# Patient Record
Sex: FEMALE | Race: BLACK OR AFRICAN AMERICAN | NOT HISPANIC OR LATINO | Employment: FULL TIME | URBAN - METROPOLITAN AREA
[De-identification: names, ages, dates, MRNs, and addresses within clinical notes are randomized per-mention and may not be internally consistent; named-entity substitution may affect disease eponyms.]

---

## 2024-01-23 ENCOUNTER — VIRTUAL VISIT (OUTPATIENT)
Dept: FAMILY MEDICINE | Facility: CLINIC | Age: 29
End: 2024-01-23
Payer: COMMERCIAL

## 2024-01-23 DIAGNOSIS — F41.9 ANXIETY: ICD-10-CM

## 2024-01-23 DIAGNOSIS — F90.2 ADHD (ATTENTION DEFICIT HYPERACTIVITY DISORDER), COMBINED TYPE: Primary | ICD-10-CM

## 2024-01-23 PROCEDURE — 99204 OFFICE O/P NEW MOD 45 MIN: CPT | Mod: 95 | Performed by: FAMILY MEDICINE

## 2024-01-23 RX ORDER — DEXTROAMPHETAMINE SACCHARATE, AMPHETAMINE ASPARTATE MONOHYDRATE, DEXTROAMPHETAMINE SULFATE AND AMPHETAMINE SULFATE 2.5; 2.5; 2.5; 2.5 MG/1; MG/1; MG/1; MG/1
10 CAPSULE, EXTENDED RELEASE ORAL DAILY
Qty: 30 CAPSULE | Refills: 0 | Status: SHIPPED | OUTPATIENT
Start: 2024-01-23 | End: 2024-03-11

## 2024-01-23 RX ORDER — ESCITALOPRAM OXALATE 10 MG/1
10 TABLET ORAL DAILY
Qty: 30 TABLET | Refills: 1 | Status: SHIPPED | OUTPATIENT
Start: 2024-01-23 | End: 2024-03-07

## 2024-01-23 NOTE — PROGRESS NOTES
Marya is a 28 year old who is being evaluated via a billable video visit.      How would you like to obtain your AVS? MyChart  If the video visit is dropped, the invitation should be resent by: Text to cell phone: [phone]  Will anyone else be joining your video visit? No          Assessment & Plan     ADHD (attention deficit hyperactivity disorder), combined type  Pt was without an insurance last year 2023 , she was diagnosed with ADHD combined type in 2019   Has been on Adderall XR 15 mg in 2022 but since she has been off in 2023 , we discussed restarting it at 10 mg Adderall XR   - amphetamine-dextroamphetamine (ADDERALL XR) 10 MG 24 hr capsule; Take 1 capsule (10 mg) by mouth daily  She has also done regular exercise , walking a lot , which has helped with some of the ADHD symptoms   Now she is starting school next month and needs to be more focused     Anxiety  She has had anxiety in the past and we discussed starting lexapro, will start at 5 mg for five days then go to the 10 mg   Has been in the  and was deployed , 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 Deployment triggered and released some anxiety , sh ehas struggled with this last 2 to 3 yrs   - Adult Mental Health  Referral; Future  - escitalopram (LEXAPRO) 10 MG tablet; Take 1 tablet (10 mg) by mouth daily Start with half a tablet daily x 5 days then one tablet daily  Will follow up with me in 4 to 5 weeks   Also discussed and gave her a referral for seeing a therapist     Follow up in one month , sooner if nay concerns   Pt is aware  and comfortable with the current plan.      Subjective   Marya is a 28 year old, presenting for the following health issues:  No chief complaint on file.      1/23/2024     1:41 PM   Additional Questions   Roomed by Judah KNUTSON     History of Present Illness       Reason for visit:  Continuous of care    She eats 2-3 servings of fruits and vegetables daily.She consumes 0 sweetened beverage(s) daily.She  exercises with enough effort to increase her heart rate 60 or more minutes per day.  She exercises with enough effort to increase her heart rate 6 days per week.   She is taking medications regularly.           Review of Systems  Constitutional, neuro, ENT, endocrine, pulmonary, cardiac, gastrointestinal, genitourinary, musculoskeletal, integument and psychiatric systems are negative, except as otherwise noted.      Objective           Vitals:  No vitals were obtained today due to virtual visit.    Physical Exam   GENERAL: alert and no distress  EYES: Eyes grossly normal to inspection.  No discharge or erythema, or obvious scleral/conjunctival abnormalities.  RESP: No audible wheeze, cough, or visible cyanosis.    SKIN: Visible skin clear. No significant rash, abnormal pigmentation or lesions.  NEURO: Cranial nerves grossly intact.  Mentation and speech appropriate for age.  PSYCH: Appropriate affect, tone, and pace of words    No results found for this or any previous visit (from the past 24 hour(s)).      Video-Visit Details    Type of service:  Video Visit     Originating Location (pt. Location): Home    Distant Location (provider location):  On-site  Platform used for Video Visit: Ld  Signed Electronically by: Hannah Freedman MD

## 2024-02-10 ENCOUNTER — HEALTH MAINTENANCE LETTER (OUTPATIENT)
Age: 29
End: 2024-02-10

## 2024-03-07 ENCOUNTER — LAB (OUTPATIENT)
Dept: LAB | Facility: CLINIC | Age: 29
End: 2024-03-07
Payer: COMMERCIAL

## 2024-03-07 ENCOUNTER — VIRTUAL VISIT (OUTPATIENT)
Dept: FAMILY MEDICINE | Facility: CLINIC | Age: 29
End: 2024-03-07
Payer: COMMERCIAL

## 2024-03-07 DIAGNOSIS — Z11.59 NEED FOR HEPATITIS C SCREENING TEST: ICD-10-CM

## 2024-03-07 DIAGNOSIS — F41.9 ANXIETY: ICD-10-CM

## 2024-03-07 DIAGNOSIS — F17.200 NICOTINE DEPENDENCE, UNCOMPLICATED, UNSPECIFIED NICOTINE PRODUCT TYPE: ICD-10-CM

## 2024-03-07 DIAGNOSIS — Z11.4 SCREENING FOR HIV (HUMAN IMMUNODEFICIENCY VIRUS): Primary | ICD-10-CM

## 2024-03-07 DIAGNOSIS — F90.2 ADHD (ATTENTION DEFICIT HYPERACTIVITY DISORDER), COMBINED TYPE: Primary | ICD-10-CM

## 2024-03-07 DIAGNOSIS — F90.2 ADHD (ATTENTION DEFICIT HYPERACTIVITY DISORDER), COMBINED TYPE: ICD-10-CM

## 2024-03-07 DIAGNOSIS — Z71.6 ENCOUNTER FOR SMOKING CESSATION COUNSELING: ICD-10-CM

## 2024-03-07 LAB
ERYTHROCYTE [DISTWIDTH] IN BLOOD BY AUTOMATED COUNT: 15 % (ref 10–15)
HCT VFR BLD AUTO: 39.8 % (ref 35–47)
HGB BLD-MCNC: 13.3 G/DL (ref 11.7–15.7)
MCH RBC QN AUTO: 29.8 PG (ref 26.5–33)
MCHC RBC AUTO-ENTMCNC: 33.4 G/DL (ref 31.5–36.5)
MCV RBC AUTO: 89 FL (ref 78–100)
PLATELET # BLD AUTO: 223 10E3/UL (ref 150–450)
RBC # BLD AUTO: 4.46 10E6/UL (ref 3.8–5.2)
WBC # BLD AUTO: 5.3 10E3/UL (ref 4–11)

## 2024-03-07 PROCEDURE — 36415 COLL VENOUS BLD VENIPUNCTURE: CPT

## 2024-03-07 PROCEDURE — 80053 COMPREHEN METABOLIC PANEL: CPT

## 2024-03-07 PROCEDURE — 86803 HEPATITIS C AB TEST: CPT

## 2024-03-07 PROCEDURE — G0481 DRUG TEST DEF 8-14 CLASSES: HCPCS

## 2024-03-07 PROCEDURE — 99214 OFFICE O/P EST MOD 30 MIN: CPT | Mod: 95 | Performed by: FAMILY MEDICINE

## 2024-03-07 PROCEDURE — 82607 VITAMIN B-12: CPT

## 2024-03-07 PROCEDURE — 82306 VITAMIN D 25 HYDROXY: CPT

## 2024-03-07 PROCEDURE — 87389 HIV-1 AG W/HIV-1&-2 AB AG IA: CPT

## 2024-03-07 PROCEDURE — 82728 ASSAY OF FERRITIN: CPT

## 2024-03-07 PROCEDURE — 85027 COMPLETE CBC AUTOMATED: CPT

## 2024-03-07 PROCEDURE — 84443 ASSAY THYROID STIM HORMONE: CPT

## 2024-03-07 RX ORDER — NICOTINE 21 MG/24HR
1 PATCH, TRANSDERMAL 24 HOURS TRANSDERMAL EVERY 24 HOURS
Qty: 42 PATCH | Refills: 0 | Status: SHIPPED | OUTPATIENT
Start: 2024-03-07 | End: 2024-04-18

## 2024-03-07 RX ORDER — ESCITALOPRAM OXALATE 10 MG/1
10 TABLET ORAL DAILY
Qty: 90 TABLET | Refills: 1 | Status: SHIPPED | OUTPATIENT
Start: 2024-03-07 | End: 2024-05-17

## 2024-03-07 RX ORDER — NICOTINE 21 MG/24HR
1 PATCH, TRANSDERMAL 24 HOURS TRANSDERMAL EVERY 24 HOURS
Qty: 14 PATCH | Refills: 0 | Status: SHIPPED | OUTPATIENT
Start: 2024-04-18 | End: 2024-05-02

## 2024-03-07 ASSESSMENT — ANXIETY QUESTIONNAIRES
6. BECOMING EASILY ANNOYED OR IRRITABLE: NOT AT ALL
8. IF YOU CHECKED OFF ANY PROBLEMS, HOW DIFFICULT HAVE THESE MADE IT FOR YOU TO DO YOUR WORK, TAKE CARE OF THINGS AT HOME, OR GET ALONG WITH OTHER PEOPLE?: NOT DIFFICULT AT ALL
GAD7 TOTAL SCORE: 2
4. TROUBLE RELAXING: NOT AT ALL
1. FEELING NERVOUS, ANXIOUS, OR ON EDGE: NOT AT ALL
7. FEELING AFRAID AS IF SOMETHING AWFUL MIGHT HAPPEN: NOT AT ALL
5. BEING SO RESTLESS THAT IT IS HARD TO SIT STILL: SEVERAL DAYS
7. FEELING AFRAID AS IF SOMETHING AWFUL MIGHT HAPPEN: NOT AT ALL
2. NOT BEING ABLE TO STOP OR CONTROL WORRYING: NOT AT ALL
IF YOU CHECKED OFF ANY PROBLEMS ON THIS QUESTIONNAIRE, HOW DIFFICULT HAVE THESE PROBLEMS MADE IT FOR YOU TO DO YOUR WORK, TAKE CARE OF THINGS AT HOME, OR GET ALONG WITH OTHER PEOPLE: NOT DIFFICULT AT ALL
3. WORRYING TOO MUCH ABOUT DIFFERENT THINGS: SEVERAL DAYS

## 2024-03-07 ASSESSMENT — PATIENT HEALTH QUESTIONNAIRE - PHQ9
10. IF YOU CHECKED OFF ANY PROBLEMS, HOW DIFFICULT HAVE THESE PROBLEMS MADE IT FOR YOU TO DO YOUR WORK, TAKE CARE OF THINGS AT HOME, OR GET ALONG WITH OTHER PEOPLE: NOT DIFFICULT AT ALL
SUM OF ALL RESPONSES TO PHQ QUESTIONS 1-9: 6
SUM OF ALL RESPONSES TO PHQ QUESTIONS 1-9: 6

## 2024-03-07 NOTE — PROGRESS NOTES
Isidro is a 28 year old who is being evaluated via a billable video visit.      How would you like to obtain your AVS? MyChart  If the video visit is dropped, the invitation should be resent by: Text to cell phone: [phone]  Will anyone else be joining your video visit? No    Assessment & Plan     ADHD (attention deficit hyperactivity disorder), combined type  Patient has regular follow-up with her primary care provider Dr. Jaquez.  Records from previous primary care provider with [facility] were reviewed.  Diagnosis confirmed.  - Drug Confirmation Panel Urine with Creat - lab collect    Nicotine dependence, uncomplicated, unspecified nicotine product type  Encounter for smoking cessation counseling  - SMOKING CESSATION COUNSELING, 3-10 MIN  - TOBACCO CESSATION - FOR HEALTH MAINTENANCE  - nicotine (NICODERM CQ) 21 MG/24HR 24 hr patch  Dispense: 42 patch; Refill: 0  - nicotine (NICODERM CQ) 14 MG/24HR 24 hr patch  Dispense: 14 patch; Refill: 0  - nicotine (NICODERM CQ) 7 MG/24HR 24 hr patch  Dispense: 14 patch; Refill: 0        Anxiety  - escitalopram (LEXAPRO) 10 MG tablet  Dispense: 90 tablet; Refill: 1  - Ferritin  - Vitamin D Deficiency  - Vitamin B12  - CBC with platelets  - Comprehensive metabolic panel (BMP + Alb, Alk Phos, ALT, AST, Total. Bili, TP)  - TSH with free T4 reflex      Subjective   Isidro is a 28 year old, presenting for the following health issues:  Refill Request (Lexapro and Adderral/)        3/7/2024    10:50 AM   Additional Questions   Roomed by Mallory ORTIZ     History of Present Illness       Reason for visit:  Continuance of care    She eats 2-3 servings of fruits and vegetables daily.She consumes 0 sweetened beverage(s) daily.She exercises with enough effort to increase her heart rate 30 to 60 minutes per day.  She exercises with enough effort to increase her heart rate 5 days per week. She is missing 1 dose(s) of medications per week.  She is not taking prescribed medications regularly due  to remembering to take.       ADHD Follow-Up    Date of last ADHD office visit: 1/23/24  Status since last visit: Improving - has been managing anxiety better w/ lexapro  Taking controlled (daily) medications as prescribed: Yes                       Parent/Patient Concerns with Medications: None    ADHD Medication       Amphetamines Disp Start End     amphetamine-dextroamphetamine (ADDERALL XR) 10 MG 24 hr capsule 30 capsule 1/23/2024 --    Sig - Route: Take 1 capsule (10 mg) by mouth daily - Oral    Class: E-Prescribe    Earliest Fill Date: 1/23/2024            Medication side effects:  Side effects noted: none  Started smoking at the age of 22 smoked hooka. Started vaping in 2020. Currently smokes disposable  vape pens 5%. One pen every 1-1.5 weeks.       Review of Systems  Constitutional, neuro, ENT, endocrine, pulmonary, cardiac, gastrointestinal, genitourinary, musculoskeletal, integument and psychiatric systems are negative, except as otherwise noted.      Objective           Vitals:  No vitals were obtained today due to virtual visit.    Physical Exam   GENERAL: alert and no distress  EYES: Eyes grossly normal to inspection.  No discharge or erythema, or obvious scleral/conjunctival abnormalities.  RESP: No audible wheeze, cough, or visible cyanosis.    SKIN: Visible skin clear. No significant rash, abnormal pigmentation or lesions.  NEURO: Cranial nerves grossly intact.  Mentation and speech appropriate for age.  PSYCH: Appropriate affect, tone, and pace of words    Historic Results on 03/07/2008   Component Date Value Ref Range Status    27 OH Vit D2 03/07/2008 13  ug/L Final    27 OH Vit D3 03/07/2008 23  ug/L Final    27 OH Vit D total 03/07/2008 36  ug/L Final    Comment: Season, race, dietary intake, and treatment affect the concentration of                            25-hydroxy-Vitamin D. Values may decrease during winter months and increase                            during summer months. Values less  than 30 ug/L may indicate Vitamin D                            deficiency.         Video-Visit Details    Type of service:  Video Visit     Originating Location (pt. Location): Home    Distant Location (provider location):  On-site  Platform used for Video Visit: Ld  Signed Electronically by: Margie Jean MD

## 2024-03-07 NOTE — LETTER
[facility] [place]  03/07/24  Patient: Marya Mtz  YOB: 1995  Medical Record Number: [id number]                                                                                  Non-Opioid Controlled Substance Agreement    This is an agreement between you and your provider regarding safe and appropriate use of controlled substances prescribed by your care team. Controlled substances are?medicines that can cause physical and mental dependence (abuse).     There are strict laws about having and using these medicines. We here at [facility] are  committed to working with you in your efforts to get better. To support you in this work, we'll help you schedule regular office appointments for medicine refills. If we must cancel or change your appointment for any reason, we'll make sure you have enough medicine to last until your next appointment.     As a Provider, I will:   Listen carefully to your concerns while treating you with respect.   Recommend a treatment plan that I believe is in your best interest and may involve therapies other than medicine.    Talk with you often about the possible benefits and the risk of harm of any medicine that we prescribe for you.  Assess the safety of this medicine and check how well it works.    Provide a plan on how to taper (discontinue or go off) using this medicine if the decision is made to stop its use.      ::  As a Patient, I understand controlled substances:     Are prescribed by my care provider to help me function or work and manage my condition(s).?  Are strong medicines and can cause serious side effects.     Need to be taken exactly as prescribed.?Combining controlled substances with certain medicines or chemicals (such as illegal drugs, alcohol, sedatives, sleeping pills, and benzodiazepines) can be dangerous or even fatal.? If I stop taking my medicines suddenly, I may have severe withdrawal symptoms.     The risks, benefits, and side  effects of these medicine(s) were explained to me. I agree that:    I will take part in other treatments as advised by my care team. This may be psychiatry or counseling, physical therapy, behavioral therapy, group treatment or a referral to specialist.    I will keep all my appointments and understand this is part of the monitoring of controlled substances.?My care team may require an office visit for EVERY controlled substance refill. If I miss appointments or don t follow instructions, my care team may stop my medicine    I will take my medicines as prescribed. I will not change the dose or schedule unless my care team tells me to. There will be no refills if I run out early.      I may be asked to come to the clinic and complete a urine drug test or complete a pill count. If I don t give a urine sample or participate in a pill count, the care team may stop my medicine.    I will only receive controlled substance prescriptions from this clinic. If I am treated by another provider, I will tell them that I am taking controlled substances and that I have a treatment agreement with this provider. I will inform my [facility] care team within one business day if I am given a prescription for any controlled substance by another healthcare provider. My [facility] care team can contact other providers and pharmacists about my use of any medicines.    It is up to me to make sure that I don't run out of my medicines on weekends or holidays.?If my care team is willing to refill my prescription without a visit, I must request refills only during office hours. Refills may take up to 3 business days to process. I will use one pharmacy to fill all my controlled substance prescriptions. I will notify the clinic about any changes to my insurance or medicine availability.    I am responsible for my prescriptions. If the medicine/prescription is lost, stolen or destroyed, it will not be replaced.?I also agree not to  share controlled substance medicines with anyone.     I am aware I should not use any illegal or recreational drugs. I agree not to drink alcohol unless my care team says I can.     If I enroll in the [place] [facility] Cannabis program, I will tell my care team before my next refill.    I will tell my care team right away if I become pregnant, have a new medical problem treated outside of my regular clinic, or have a change in my medicines.     I understand that this medicine can affect my thinking, judgment and reaction time.? Alcohol and drugs affect the brain and body, which can affect the safety of my driving. Being under the influence of alcohol or drugs can affect my decision-making, behaviors, personal safety and the safety of others. Driving while impaired (DWI) can occur if a person is driving, operating or in physical control of a car, motorcycle, boat, snowmobile, ATV, motorbike, off-road vehicle or any other motor vehicle ([place] Statute 169A.20). I understand the risk if I choose to drive or operate any vehicle or machinery.    I understand that if I do not follow any of the conditions above, my prescriptions or treatment may be stopped or changed.   I agree that my provider, clinic care team and pharmacy may work with any city, state or federal law enforcement agency that investigates the misuse, sale or other diversion of my controlled medicine. I will allow my provider to discuss my care with, or share a copy of, this agreement with any other treating provider, pharmacy or emergency room where I receive care.     I have read this agreement and have asked questions about anything I did not understand.    ________________________________________________________  Patient Signature - Marya Mousa     ___________________                   Date     ________________________________________________________  Provider Signature Bryon Jean MD       ___________________                   Date      ________________________________________________________  Witness Signature (required if provider not present while patient signing)          ___________________                   Date

## 2024-03-07 NOTE — PATIENT INSTRUCTIONS
Nicotine Transdermal System   Habitrol, Nicoderm C-Q    Uses  For quitting smoking.    Instructions  DO NOT take this medicine by mouth.    Avoid placing the patch near the breast.    Remove the patch after 24 hours.    Keep the medicine at room temperature. Avoid heat and direct light.    This patch should not be cut.    Wash your hands before and after handling this medicine.    Remove old patch before applying new one. Change the location of the new patch.    If you have vivid dreams or trouble sleeping, you may remove the patch before going to sleep.    Ask your doctor or pharmacist about locations on your body where this patch can be used.    Remove the plastic liner that protects the sticky side of the patch before applying to the skin.    Be sure the area of skin is clean and dry before putting on a new patch.    Apply the patch to a clean, dry, hairless area.    Press the patch firmly for a few seconds to make sure it stays in place.    After removing the patch, fold it together and discard it out of reach of children and pets.    Please ask your doctor or pharmacist how you can safely dispose of used patches.    If the skin under the patch becomes irritated, remove the patch. Do not apply a new patch to the area until the skin feels better.    To avoid irritating your skin, use a different location for a new patch.    Apply the patch only to normal looking skin. Avoid areas of the skin that are red, have scrapes, or damaged.    If the patch falls off, apply a new a patch on a different location of the body.    Please tell your doctor and pharmacist about all the medicines you take. Include both prescription and over-the-counter medicines. Also tell them about any vitamins, herbal medicines, or anything else you take for your health.    If you need to stop this medicine, your doctor may wish to gradually reduce the dosage before stopping.    Do not use more than 1 patch at any one time.    Cautions  Tell  your doctor and pharmacist if you ever had an allergic reaction to a medicine. Symptoms of an allergic reaction can include trouble breathing, skin rash, itching, swelling, or severe dizziness.    Do not use the medication any more than instructed.    Avoid smoking while on this medicine. Smoking may increase your risk for stroke, heart attack, blood clots, high blood pressure, and other diseases of the heart and blood vessels.    Tell the doctor or pharmacist if you are pregnant, planning to be pregnant, or breastfeeding.    Ask your pharmacist if this medicine can interact with any of your other medicines. Be sure to tell them about all the medicines you take.    Please tell all your doctors and dentists that you are on this medicine before they provide care.    Side Effects  The following is a list of some common side effects from this medicine. Please speak with your doctor about what you should do if you experience these or other side effects.    skin irritation where medicine is applied    If you have any of the following side effects, you may be getting too much medicine. Please contact your doctor to let them know about these side effects.    diarrhea  dizziness  nausea  rapid heartbeat  vomiting    A few people may have an allergic reaction to this medicine. Symptoms can include difficulty breathing, skin rash, itching, swelling, or severe dizziness. If you notice any of these symptoms, seek medical help quickly.    Extra  Please speak with your doctor, nurse, or pharmacist if you have any questions about this medicine.      [email or web address]  IMPORTANT NOTE: This document tells you briefly how to take your medicine, but it does not tell you all there is to know about it. Your doctor or pharmacist may give you other documents about your medicine. Please talk to them if you have any questions. Always follow their advice. There is a more complete description of this medicine  available in English. Scan this code on your smartphone or tablet or use the web address below. You can also ask your pharmacist for a printout. If you have any questions, please ask your pharmacist.   2021 [email or web address].      3478-2981 [employer]. All rights reserved. This information is not intended as a substitute for professional medical care. Always follow your healthcare professional's instructions.

## 2024-03-08 LAB
ALBUMIN SERPL BCG-MCNC: 4.3 G/DL (ref 3.5–5.2)
ALP SERPL-CCNC: 74 U/L (ref 40–150)
ALT SERPL W P-5'-P-CCNC: 12 U/L (ref 0–50)
ANION GAP SERPL CALCULATED.3IONS-SCNC: 10 MMOL/L (ref 7–15)
AST SERPL W P-5'-P-CCNC: 22 U/L (ref 0–45)
BILIRUB SERPL-MCNC: 0.2 MG/DL
BUN SERPL-MCNC: 6 MG/DL (ref 6–20)
CALCIUM SERPL-MCNC: 9.1 MG/DL (ref 8.6–10)
CHLORIDE SERPL-SCNC: 106 MMOL/L (ref 98–107)
CREAT SERPL-MCNC: 0.63 MG/DL (ref 0.51–0.95)
CREAT UR-MCNC: 102 MG/DL
DEPRECATED HCO3 PLAS-SCNC: 25 MMOL/L (ref 22–29)
EGFRCR SERPLBLD CKD-EPI 2021: >90 ML/MIN/1.73M2
FERRITIN SERPL-MCNC: 44 NG/ML (ref 6–175)
GLUCOSE SERPL-MCNC: 105 MG/DL (ref 70–99)
HCV AB SERPL QL IA: NONREACTIVE
HIV 1+2 AB+HIV1 P24 AG SERPL QL IA: NONREACTIVE
POTASSIUM SERPL-SCNC: 4 MMOL/L (ref 3.4–5.3)
PROT SERPL-MCNC: 7.2 G/DL (ref 6.4–8.3)
SODIUM SERPL-SCNC: 141 MMOL/L (ref 135–145)
TSH SERPL DL<=0.005 MIU/L-ACNC: 1.25 UIU/ML (ref 0.3–4.2)
VIT B12 SERPL-MCNC: 518 PG/ML (ref 232–1245)
VIT D+METAB SERPL-MCNC: 24 NG/ML (ref 20–50)

## 2024-03-11 ENCOUNTER — MYC REFILL (OUTPATIENT)
Dept: FAMILY MEDICINE | Facility: CLINIC | Age: 29
End: 2024-03-11
Payer: COMMERCIAL

## 2024-03-11 DIAGNOSIS — F90.2 ADHD (ATTENTION DEFICIT HYPERACTIVITY DISORDER), COMBINED TYPE: ICD-10-CM

## 2024-03-12 LAB
AMPHET UR CFM-MCNC: 435 NG/ML
AMPHET/CREAT UR: 426 NG/MG {CREAT}

## 2024-03-12 RX ORDER — DEXTROAMPHETAMINE SACCHARATE, AMPHETAMINE ASPARTATE MONOHYDRATE, DEXTROAMPHETAMINE SULFATE AND AMPHETAMINE SULFATE 2.5; 2.5; 2.5; 2.5 MG/1; MG/1; MG/1; MG/1
10 CAPSULE, EXTENDED RELEASE ORAL DAILY
Qty: 30 CAPSULE | Refills: 0 | Status: SHIPPED | OUTPATIENT
Start: 2024-03-12

## 2024-03-19 ENCOUNTER — VIRTUAL VISIT (OUTPATIENT)
Dept: PSYCHOLOGY | Facility: CLINIC | Age: 29
End: 2024-03-19
Payer: COMMERCIAL

## 2024-03-19 DIAGNOSIS — F32.1 MODERATE MAJOR DEPRESSION (H): ICD-10-CM

## 2024-03-19 DIAGNOSIS — F41.1 GAD (GENERALIZED ANXIETY DISORDER): ICD-10-CM

## 2024-03-19 DIAGNOSIS — F90.2 ADHD (ATTENTION DEFICIT HYPERACTIVITY DISORDER), COMBINED TYPE: Primary | ICD-10-CM

## 2024-03-19 PROCEDURE — 90791 PSYCH DIAGNOSTIC EVALUATION: CPT | Mod: 95 | Performed by: SOCIAL WORKER

## 2024-03-19 ASSESSMENT — PATIENT HEALTH QUESTIONNAIRE - PHQ9
10. IF YOU CHECKED OFF ANY PROBLEMS, HOW DIFFICULT HAVE THESE PROBLEMS MADE IT FOR YOU TO DO YOUR WORK, TAKE CARE OF THINGS AT HOME, OR GET ALONG WITH OTHER PEOPLE: NOT DIFFICULT AT ALL
SUM OF ALL RESPONSES TO PHQ QUESTIONS 1-9: 3
SUM OF ALL RESPONSES TO PHQ QUESTIONS 1-9: 3

## 2024-03-24 ASSESSMENT — COLUMBIA-SUICIDE SEVERITY RATING SCALE - C-SSRS
3. HAVE YOU BEEN THINKING ABOUT HOW YOU MIGHT KILL YOURSELF?: NO
TOTAL  NUMBER OF INTERRUPTED ATTEMPTS LIFETIME: NO
5. HAVE YOU STARTED TO WORK OUT OR WORKED OUT THE DETAILS OF HOW TO KILL YOURSELF? DO YOU INTEND TO CARRY OUT THIS PLAN?: NO
6. HAVE YOU EVER DONE ANYTHING, STARTED TO DO ANYTHING, OR PREPARED TO DO ANYTHING TO END YOUR LIFE?: NO
2. HAVE YOU ACTUALLY HAD ANY THOUGHTS OF KILLING YOURSELF?: YES
1. IN THE PAST MONTH, HAVE YOU WISHED YOU WERE DEAD OR WISHED YOU COULD GO TO SLEEP AND NOT WAKE UP?: NO
REASONS FOR IDEATION LIFETIME: DOES NOT APPLY
2. HAVE YOU ACTUALLY HAD ANY THOUGHTS OF KILLING YOURSELF?: NO
REASONS FOR IDEATION PAST MONTH: DOES NOT APPLY
1. HAVE YOU WISHED YOU WERE DEAD OR WISHED YOU COULD GO TO SLEEP AND NOT WAKE UP?: YES
TOTAL  NUMBER OF ABORTED OR SELF INTERRUPTED ATTEMPTS LIFETIME: NO
ATTEMPT LIFETIME: NO
4. HAVE YOU HAD THESE THOUGHTS AND HAD SOME INTENTION OF ACTING ON THEM?: NO

## 2024-03-24 NOTE — PATIENT INSTRUCTIONS
Client interested in pursuing therapy for anxiety, focus and Trauma.  Would like to be seen sooner than therapist availability.   Therapist will reach out to team.
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
## 2024-05-01 ENCOUNTER — VIRTUAL VISIT (OUTPATIENT)
Dept: PSYCHOLOGY | Facility: CLINIC | Age: 29
End: 2024-05-01
Payer: COMMERCIAL

## 2024-05-01 DIAGNOSIS — F32.1 MODERATE MAJOR DEPRESSION (H): ICD-10-CM

## 2024-05-01 DIAGNOSIS — F90.2 ADHD (ATTENTION DEFICIT HYPERACTIVITY DISORDER), COMBINED TYPE: Primary | ICD-10-CM

## 2024-05-01 DIAGNOSIS — F41.1 GAD (GENERALIZED ANXIETY DISORDER): ICD-10-CM

## 2024-05-01 DIAGNOSIS — F43.10 POST TRAUMATIC STRESS DISORDER: ICD-10-CM

## 2024-05-01 PROCEDURE — 90837 PSYTX W PT 60 MINUTES: CPT | Mod: 95
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
## 2024-05-09 ENCOUNTER — VIRTUAL VISIT (OUTPATIENT)
Dept: PSYCHOLOGY | Facility: CLINIC | Age: 29
End: 2024-05-09
Payer: COMMERCIAL

## 2024-05-09 DIAGNOSIS — F32.1 MODERATE MAJOR DEPRESSION (H): ICD-10-CM

## 2024-05-09 DIAGNOSIS — F90.2 ADHD (ATTENTION DEFICIT HYPERACTIVITY DISORDER), COMBINED TYPE: Primary | ICD-10-CM

## 2024-05-09 DIAGNOSIS — F41.1 GAD (GENERALIZED ANXIETY DISORDER): ICD-10-CM

## 2024-05-09 PROCEDURE — 90834 PSYTX W PT 45 MINUTES: CPT | Mod: 95

## 2024-05-09 ASSESSMENT — ANXIETY QUESTIONNAIRES
7. FEELING AFRAID AS IF SOMETHING AWFUL MIGHT HAPPEN: NOT AT ALL
2. NOT BEING ABLE TO STOP OR CONTROL WORRYING: SEVERAL DAYS
IF YOU CHECKED OFF ANY PROBLEMS ON THIS QUESTIONNAIRE, HOW DIFFICULT HAVE THESE PROBLEMS MADE IT FOR YOU TO DO YOUR WORK, TAKE CARE OF THINGS AT HOME, OR GET ALONG WITH OTHER PEOPLE: SOMEWHAT DIFFICULT
6. BECOMING EASILY ANNOYED OR IRRITABLE: NOT AT ALL
3. WORRYING TOO MUCH ABOUT DIFFERENT THINGS: SEVERAL DAYS
GAD7 TOTAL SCORE: 5
GAD7 TOTAL SCORE: 5
5. BEING SO RESTLESS THAT IT IS HARD TO SIT STILL: SEVERAL DAYS
1. FEELING NERVOUS, ANXIOUS, OR ON EDGE: SEVERAL DAYS

## 2024-05-09 ASSESSMENT — PATIENT HEALTH QUESTIONNAIRE - PHQ9: 5. POOR APPETITE OR OVEREATING: SEVERAL DAYS
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
## 2024-05-16 ENCOUNTER — VIRTUAL VISIT (OUTPATIENT)
Dept: PSYCHOLOGY | Facility: CLINIC | Age: 29
End: 2024-05-16
Payer: COMMERCIAL

## 2024-05-16 DIAGNOSIS — F32.1 MODERATE MAJOR DEPRESSION (H): ICD-10-CM

## 2024-05-16 DIAGNOSIS — F41.1 GAD (GENERALIZED ANXIETY DISORDER): ICD-10-CM

## 2024-05-16 DIAGNOSIS — F90.2 ADHD (ATTENTION DEFICIT HYPERACTIVITY DISORDER), COMBINED TYPE: Primary | ICD-10-CM

## 2024-05-16 PROCEDURE — 90834 PSYTX W PT 45 MINUTES: CPT | Mod: 95
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
## 2024-05-17 ENCOUNTER — VIRTUAL VISIT (OUTPATIENT)
Dept: FAMILY MEDICINE | Facility: CLINIC | Age: 29
End: 2024-05-17
Payer: COMMERCIAL

## 2024-05-17 DIAGNOSIS — F90.2 ADHD (ATTENTION DEFICIT HYPERACTIVITY DISORDER), COMBINED TYPE: Primary | ICD-10-CM

## 2024-05-17 DIAGNOSIS — F41.9 ANXIETY: ICD-10-CM

## 2024-05-17 PROCEDURE — 99214 OFFICE O/P EST MOD 30 MIN: CPT | Mod: 95 | Performed by: FAMILY MEDICINE

## 2024-05-17 RX ORDER — ESCITALOPRAM OXALATE 10 MG/1
10 TABLET ORAL DAILY
Qty: 90 TABLET | Refills: 1 | Status: SHIPPED | OUTPATIENT
Start: 2024-05-17

## 2024-05-17 RX ORDER — DEXTROAMPHETAMINE SACCHARATE, AMPHETAMINE ASPARTATE MONOHYDRATE, DEXTROAMPHETAMINE SULFATE AND AMPHETAMINE SULFATE 3.75; 3.75; 3.75; 3.75 MG/1; MG/1; MG/1; MG/1
15 CAPSULE, EXTENDED RELEASE ORAL DAILY
Qty: 30 CAPSULE | Refills: 0 | Status: SHIPPED | OUTPATIENT
Start: 2024-06-16 | End: 2024-07-16

## 2024-05-17 RX ORDER — DEXTROAMPHETAMINE SACCHARATE, AMPHETAMINE ASPARTATE MONOHYDRATE, DEXTROAMPHETAMINE SULFATE AND AMPHETAMINE SULFATE 3.75; 3.75; 3.75; 3.75 MG/1; MG/1; MG/1; MG/1
15 CAPSULE, EXTENDED RELEASE ORAL DAILY
Qty: 30 CAPSULE | Refills: 0 | Status: SHIPPED | OUTPATIENT
Start: 2024-05-17 | End: 2024-06-16

## 2024-05-17 RX ORDER — DEXTROAMPHETAMINE SACCHARATE, AMPHETAMINE ASPARTATE MONOHYDRATE, DEXTROAMPHETAMINE SULFATE AND AMPHETAMINE SULFATE 3.75; 3.75; 3.75; 3.75 MG/1; MG/1; MG/1; MG/1
15 CAPSULE, EXTENDED RELEASE ORAL DAILY
Qty: 30 CAPSULE | Refills: 0 | Status: SHIPPED | OUTPATIENT
Start: 2024-07-16 | End: 2024-08-15

## 2024-05-17 NOTE — LETTER
May 17, 2024      Marya Mtz  [address] [address]        To Whom It May Concern:    Marya Mtz was seen in our clinic. She is under my professional care. She needs to have accommodations for school, for testing, homework etc because of her diagnosis of ADHD .         Sincerely,    Hannah Freedman M.D.

## 2024-05-17 NOTE — PROGRESS NOTES
Isidro is a 28 year old who is being evaluated via a billable video visit.    How would you like to obtain your AVS? MyChart  If the video visit is dropped, the invitation should be resent by: Text to cell phone: [phone]  Will anyone else be joining your video visit? No      Assessment & Plan     Anxiety  Has been seeing  a therapist , which is helping , also feels better on Lexapro , no side effects - refilled   - escitalopram (LEXAPRO) 10 MG tablet; Take 1 tablet (10 mg) by mouth daily    ADHD (attention deficit hyperactivity disorder), combined type  Focus and attention have improved but we started on a lower dose than what she used to be . She has no side effects , seems that the 10 mg wears out so we discussed increase to 15 mg  of the Adderall XR   - amphetamine-dextroamphetamine (ADDERALL XR) 15 MG 24 hr capsule; Take 1 capsule (15 mg) by mouth daily for 30 days  - amphetamine-dextroamphetamine (ADDERALL XR) 15 MG 24 hr capsule; Take 1 capsule (15 mg) by mouth daily for 30 days  - amphetamine-dextroamphetamine (ADDERALL XR) 15 MG 24 hr capsule; Take 1 capsule (15 mg) by mouth daily for 30 days      Follow up in three months sooner if any concern   Pt is aware  and comfortable with the current plan.      Subjective   Isidro is a 28 year old, presenting for the following health issues:  No chief complaint on file.        5/17/2024    12:58 PM   Additional Questions   Roomed by ANGEL Moran         5/17/2024   Forms   Any forms needing to be completed Yes     History of Present Illness       Reason for visit:  Medication refill    She eats 2-3 servings of fruits and vegetables daily.She consumes 1 sweetened beverage(s) daily.She exercises with enough effort to increase her heart rate 60 or more minutes per day.  She exercises with enough effort to increase her heart rate 6 days per week. She is missing 2 dose(s) of medications per week.  She is not taking prescribed medications regularly due to remembering to  "take.    ADHD Follow-Up    Date of last ADHD office visit: 01/2024  Status since last visit: Improving  Taking controlled (daily) medications as prescribed: Yes                       Parent/Patient Concerns with Medications: Pt requesting medication refill.   ADHD Medication       Amphetamines Disp Start End     amphetamine-dextroamphetamine (ADDERALL XR) 10 MG 24 hr capsule 30 capsule 3/12/2024 --    Sig - Route: Take 1 capsule (10 mg) by mouth daily - Oral    Class: E-Prescribe    Earliest Fill Date: 3/12/2024              Sleep: no problems  Home/Family Concerns: None  Peer Concerns: None    Co-Morbid Diagnosis: Anxiety    Currently in counseling: Yes        Medication Benefits:   Controlled symptoms: Attention span, Distractability, Finishing tasks, and Impulse control  Uncontrolled Symptoms : None    Medication side effects:  Side effects noted: none  Denies: appetite suppression, weight loss, insomnia, tics, palpitations, stomach ache, headache, emotional lability, rebound irritability, drowsiness, \"zombie\" effect, growth suppression, and dry mouth           Review of Systems  Constitutional, HEENT, cardiovascular, pulmonary, GI, , musculoskeletal, neuro, skin, endocrine and psych systems are negative, except as otherwise noted.      Objective           Vitals:  No vitals were obtained today due to virtual visit.    Physical Exam   GENERAL: alert and no distress  EYES: Eyes grossly normal to inspection.  No discharge or erythema, or obvious scleral/conjunctival abnormalities.  RESP: No audible wheeze, cough, or visible cyanosis.    SKIN: Visible skin clear. No significant rash, abnormal pigmentation or lesions.  NEURO: Cranial nerves grossly intact.  Mentation and speech appropriate for age.  PSYCH: Appropriate affect, tone, and pace of words    No results found for any visits on 05/17/24.      Video-Visit Details    Type of service:  Video Visit   Originating Location (pt. Location): Home    Distant " Location (provider location):  On-site  Platform used for Video Visit: Ld  Signed Electronically by: Hannah Freedman MD

## 2024-05-22 ENCOUNTER — VIRTUAL VISIT (OUTPATIENT)
Dept: PSYCHOLOGY | Facility: CLINIC | Age: 29
End: 2024-05-22
Payer: COMMERCIAL

## 2024-05-22 DIAGNOSIS — F90.2 ADHD (ATTENTION DEFICIT HYPERACTIVITY DISORDER), COMBINED TYPE: Primary | ICD-10-CM

## 2024-05-22 DIAGNOSIS — F32.1 MODERATE MAJOR DEPRESSION (H): ICD-10-CM

## 2024-05-22 DIAGNOSIS — F41.1 GAD (GENERALIZED ANXIETY DISORDER): ICD-10-CM

## 2024-05-22 PROCEDURE — 90837 PSYTX W PT 60 MINUTES: CPT | Mod: 95

## 2024-05-22 ASSESSMENT — PATIENT HEALTH QUESTIONNAIRE - PHQ9: SUM OF ALL RESPONSES TO PHQ QUESTIONS 1-9: 5
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
## 2024-05-31 ENCOUNTER — VIRTUAL VISIT (OUTPATIENT)
Dept: PSYCHOLOGY | Facility: CLINIC | Age: 29
End: 2024-05-31
Payer: COMMERCIAL

## 2024-05-31 DIAGNOSIS — F32.1 MODERATE MAJOR DEPRESSION (H): ICD-10-CM

## 2024-05-31 DIAGNOSIS — F41.1 GAD (GENERALIZED ANXIETY DISORDER): ICD-10-CM

## 2024-05-31 DIAGNOSIS — F90.2 ADHD (ATTENTION DEFICIT HYPERACTIVITY DISORDER), COMBINED TYPE: Primary | ICD-10-CM

## 2024-05-31 PROCEDURE — 90834 PSYTX W PT 45 MINUTES: CPT | Mod: 95
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
## 2024-06-06 ENCOUNTER — VIRTUAL VISIT (OUTPATIENT)
Dept: PSYCHOLOGY | Facility: CLINIC | Age: 29
End: 2024-06-06
Payer: COMMERCIAL

## 2024-06-06 DIAGNOSIS — F32.1 MODERATE MAJOR DEPRESSION (H): ICD-10-CM

## 2024-06-06 DIAGNOSIS — F90.2 ADHD (ATTENTION DEFICIT HYPERACTIVITY DISORDER), COMBINED TYPE: Primary | ICD-10-CM

## 2024-06-06 DIAGNOSIS — F41.1 GAD (GENERALIZED ANXIETY DISORDER): ICD-10-CM

## 2024-06-06 PROCEDURE — 90834 PSYTX W PT 45 MINUTES: CPT | Mod: 95
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
## 2024-06-13 ENCOUNTER — VIRTUAL VISIT (OUTPATIENT)
Dept: PSYCHOLOGY | Facility: CLINIC | Age: 29
End: 2024-06-13
Payer: COMMERCIAL

## 2024-06-13 DIAGNOSIS — F32.1 MODERATE MAJOR DEPRESSION (H): ICD-10-CM

## 2024-06-13 DIAGNOSIS — F41.1 GAD (GENERALIZED ANXIETY DISORDER): ICD-10-CM

## 2024-06-13 DIAGNOSIS — F90.2 ADHD (ATTENTION DEFICIT HYPERACTIVITY DISORDER), COMBINED TYPE: Primary | ICD-10-CM

## 2024-06-13 PROCEDURE — 90834 PSYTX W PT 45 MINUTES: CPT | Mod: 95
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
## 2024-07-03 ENCOUNTER — VIRTUAL VISIT (OUTPATIENT)
Dept: PSYCHOLOGY | Facility: CLINIC | Age: 29
End: 2024-07-03
Payer: COMMERCIAL

## 2024-07-03 DIAGNOSIS — F32.1 MODERATE MAJOR DEPRESSION (H): ICD-10-CM

## 2024-07-03 DIAGNOSIS — F41.1 GAD (GENERALIZED ANXIETY DISORDER): ICD-10-CM

## 2024-07-03 DIAGNOSIS — F90.2 ADHD (ATTENTION DEFICIT HYPERACTIVITY DISORDER), COMBINED TYPE: Primary | ICD-10-CM

## 2024-07-03 PROCEDURE — 90837 PSYTX W PT 60 MINUTES: CPT | Mod: 95
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
## 2024-07-10 ENCOUNTER — VIRTUAL VISIT (OUTPATIENT)
Dept: PSYCHOLOGY | Facility: CLINIC | Age: 29
End: 2024-07-10
Payer: COMMERCIAL

## 2024-07-10 DIAGNOSIS — F41.1 GAD (GENERALIZED ANXIETY DISORDER): ICD-10-CM

## 2024-07-10 DIAGNOSIS — F90.2 ADHD (ATTENTION DEFICIT HYPERACTIVITY DISORDER), COMBINED TYPE: Primary | ICD-10-CM

## 2024-07-10 DIAGNOSIS — F32.1 MODERATE MAJOR DEPRESSION (H): ICD-10-CM

## 2024-07-10 PROCEDURE — 90834 PSYTX W PT 45 MINUTES: CPT | Mod: 95
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
## 2024-07-18 ENCOUNTER — VIRTUAL VISIT (OUTPATIENT)
Dept: PSYCHOLOGY | Facility: CLINIC | Age: 29
End: 2024-07-18
Payer: COMMERCIAL

## 2024-07-18 DIAGNOSIS — F41.1 GAD (GENERALIZED ANXIETY DISORDER): ICD-10-CM

## 2024-07-18 DIAGNOSIS — F32.1 MODERATE MAJOR DEPRESSION (H): ICD-10-CM

## 2024-07-18 DIAGNOSIS — F43.9 TRAUMA AND STRESSOR-RELATED DISORDER: ICD-10-CM

## 2024-07-18 DIAGNOSIS — F90.2 ADHD (ATTENTION DEFICIT HYPERACTIVITY DISORDER), COMBINED TYPE: Primary | ICD-10-CM

## 2024-07-18 PROCEDURE — 90834 PSYTX W PT 45 MINUTES: CPT | Mod: 95

## 2024-07-18 ASSESSMENT — ANXIETY QUESTIONNAIRES
6. BECOMING EASILY ANNOYED OR IRRITABLE: SEVERAL DAYS
5. BEING SO RESTLESS THAT IT IS HARD TO SIT STILL: SEVERAL DAYS
1. FEELING NERVOUS, ANXIOUS, OR ON EDGE: SEVERAL DAYS
GAD7 TOTAL SCORE: 6
IF YOU CHECKED OFF ANY PROBLEMS ON THIS QUESTIONNAIRE, HOW DIFFICULT HAVE THESE PROBLEMS MADE IT FOR YOU TO DO YOUR WORK, TAKE CARE OF THINGS AT HOME, OR GET ALONG WITH OTHER PEOPLE: NOT DIFFICULT AT ALL
7. FEELING AFRAID AS IF SOMETHING AWFUL MIGHT HAPPEN: NOT AT ALL
3. WORRYING TOO MUCH ABOUT DIFFERENT THINGS: MORE THAN HALF THE DAYS
2. NOT BEING ABLE TO STOP OR CONTROL WORRYING: SEVERAL DAYS
GAD7 TOTAL SCORE: 6

## 2024-07-18 ASSESSMENT — PATIENT HEALTH QUESTIONNAIRE - PHQ9: 5. POOR APPETITE OR OVEREATING: NOT AT ALL
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
## 2024-07-24 ENCOUNTER — VIRTUAL VISIT (OUTPATIENT)
Dept: PSYCHOLOGY | Facility: CLINIC | Age: 29
End: 2024-07-24
Payer: COMMERCIAL

## 2024-07-24 DIAGNOSIS — F43.9 TRAUMA AND STRESSOR-RELATED DISORDER: ICD-10-CM

## 2024-07-24 DIAGNOSIS — F41.1 GAD (GENERALIZED ANXIETY DISORDER): ICD-10-CM

## 2024-07-24 DIAGNOSIS — F32.1 MODERATE MAJOR DEPRESSION (H): ICD-10-CM

## 2024-07-24 DIAGNOSIS — F90.2 ADHD (ATTENTION DEFICIT HYPERACTIVITY DISORDER), COMBINED TYPE: Primary | ICD-10-CM

## 2024-07-24 PROCEDURE — 90837 PSYTX W PT 60 MINUTES: CPT | Mod: 95

## 2024-07-24 ASSESSMENT — PATIENT HEALTH QUESTIONNAIRE - PHQ9: SUM OF ALL RESPONSES TO PHQ QUESTIONS 1-9: 9
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
## 2024-07-31 ENCOUNTER — VIRTUAL VISIT (OUTPATIENT)
Dept: PSYCHOLOGY | Facility: CLINIC | Age: 29
End: 2024-07-31
Payer: COMMERCIAL

## 2024-07-31 DIAGNOSIS — F32.1 MODERATE MAJOR DEPRESSION (H): ICD-10-CM

## 2024-07-31 DIAGNOSIS — F41.1 GAD (GENERALIZED ANXIETY DISORDER): ICD-10-CM

## 2024-07-31 DIAGNOSIS — F90.2 ADHD (ATTENTION DEFICIT HYPERACTIVITY DISORDER), COMBINED TYPE: Primary | ICD-10-CM

## 2024-07-31 DIAGNOSIS — F43.9 TRAUMA AND STRESSOR-RELATED DISORDER: ICD-10-CM

## 2024-07-31 PROCEDURE — 90834 PSYTX W PT 45 MINUTES: CPT | Mod: 95
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
## 2024-08-28 ENCOUNTER — VIRTUAL VISIT (OUTPATIENT)
Dept: PSYCHOLOGY | Facility: CLINIC | Age: 29
End: 2024-08-28
Payer: COMMERCIAL

## 2024-08-28 DIAGNOSIS — F43.9 TRAUMA AND STRESSOR-RELATED DISORDER: ICD-10-CM

## 2024-08-28 DIAGNOSIS — F41.1 GAD (GENERALIZED ANXIETY DISORDER): ICD-10-CM

## 2024-08-28 DIAGNOSIS — F90.2 ADHD (ATTENTION DEFICIT HYPERACTIVITY DISORDER), COMBINED TYPE: Primary | ICD-10-CM

## 2024-08-28 DIAGNOSIS — F32.1 MODERATE MAJOR DEPRESSION (H): ICD-10-CM

## 2024-08-28 PROCEDURE — 90834 PSYTX W PT 45 MINUTES: CPT | Mod: 95
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
## 2024-09-04 ENCOUNTER — VIRTUAL VISIT (OUTPATIENT)
Dept: PSYCHOLOGY | Facility: CLINIC | Age: 29
End: 2024-09-04
Payer: COMMERCIAL

## 2024-09-04 DIAGNOSIS — F90.2 ADHD (ATTENTION DEFICIT HYPERACTIVITY DISORDER), COMBINED TYPE: Primary | ICD-10-CM

## 2024-09-04 DIAGNOSIS — F32.1 MODERATE MAJOR DEPRESSION (H): ICD-10-CM

## 2024-09-04 DIAGNOSIS — F43.9 TRAUMA AND STRESSOR-RELATED DISORDER: ICD-10-CM

## 2024-09-04 DIAGNOSIS — F41.1 GAD (GENERALIZED ANXIETY DISORDER): ICD-10-CM

## 2024-09-04 PROCEDURE — 90832 PSYTX W PT 30 MINUTES: CPT | Mod: 95

## 2024-09-04 ASSESSMENT — COLUMBIA-SUICIDE SEVERITY RATING SCALE - C-SSRS
SUICIDE, SINCE LAST CONTACT: NO
ATTEMPT SINCE LAST CONTACT: NO
TOTAL  NUMBER OF ABORTED OR SELF INTERRUPTED ATTEMPTS SINCE LAST CONTACT: NO
6. HAVE YOU EVER DONE ANYTHING, STARTED TO DO ANYTHING, OR PREPARED TO DO ANYTHING TO END YOUR LIFE?: NO
2. HAVE YOU ACTUALLY HAD ANY THOUGHTS OF KILLING YOURSELF?: NO
1. SINCE LAST CONTACT, HAVE YOU WISHED YOU WERE DEAD OR WISHED YOU COULD GO TO SLEEP AND NOT WAKE UP?: NO
TOTAL  NUMBER OF INTERRUPTED ATTEMPTS SINCE LAST CONTACT: NO
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
## 2024-09-06 ENCOUNTER — E-VISIT (OUTPATIENT)
Dept: URGENT CARE | Facility: CLINIC | Age: 29
End: 2024-09-06
Payer: COMMERCIAL

## 2024-09-06 DIAGNOSIS — J02.9 SORE THROAT: Primary | ICD-10-CM

## 2024-09-06 PROCEDURE — 99421 OL DIG E/M SVC 5-10 MIN: CPT | Performed by: INTERNAL MEDICINE

## 2024-09-06 NOTE — PATIENT INSTRUCTIONS
Dear Isidro,    After reviewing your responses, I would like you to come in for a swab to make sure we treat you correctly. This swab is to evaluate you for possible COVID and Strep Throat, and should be scheduled for today or tomorrow. Please use the Schedule Now button in [employer] to schedule your swab. Otherwise, click this link to schedule a lab only appointment.    Lab appointments are not available at most locations on the weekends. If no Lab Only appointment is available, you should be seen in any of our convenient Urgent Care Centers for an in person visit, which can be found on our website here.    You will receive instructions with your results in [employer] once they are available.     If your symptoms worsen, you develop difficulty breathing, difficulty with drinking enough to stay hydrated, or fevers for more than 5 days, please contact your primary care provider for an appointment or visit an Urgent Care Center to be seen.      Thanks again for choosing us as your health care partner.   Nini Hubbard MD

## 2024-09-09 ENCOUNTER — LAB (OUTPATIENT)
Dept: LAB | Facility: CLINIC | Age: 29
End: 2024-09-09
Payer: COMMERCIAL

## 2024-09-09 DIAGNOSIS — J02.9 SORE THROAT: ICD-10-CM

## 2024-09-09 LAB
DEPRECATED S PYO AG THROAT QL EIA: NEGATIVE
GROUP A STREP BY PCR: NOT DETECTED

## 2024-09-09 PROCEDURE — 87635 SARS-COV-2 COVID-19 AMP PRB: CPT

## 2024-09-09 PROCEDURE — 87651 STREP A DNA AMP PROBE: CPT

## 2024-09-10 LAB — SARS-COV-2 RNA RESP QL NAA+PROBE: NEGATIVE

## 2024-09-26 ENCOUNTER — VIRTUAL VISIT (OUTPATIENT)
Dept: URGENT CARE | Facility: CLINIC | Age: 29
End: 2024-09-26
Payer: COMMERCIAL

## 2024-09-26 DIAGNOSIS — M54.50 ACUTE BILATERAL LOW BACK PAIN WITHOUT SCIATICA: Primary | ICD-10-CM

## 2024-09-26 PROCEDURE — 99213 OFFICE O/P EST LOW 20 MIN: CPT | Mod: 95 | Performed by: NURSE PRACTITIONER

## 2024-09-26 NOTE — PROGRESS NOTES
Isidro is a 29 year old who is being evaluated via a billable video visit.    How would you like to obtain your AVS? MyChart  If the video visit is dropped, the invitation should be resent by: Text to cell phone: [phone]  Will anyone else be joining your video visit? No      Assessment & Plan     (M54.50) Acute bilateral low back pain without sciatica  (primary encounter diagnosis)    Plan: needs note off work this weekend,given  Will follow up with pcp regarding PT  Has muscle relaxers, lidocaine patches  Will home treat and monitor sx  Call if new or worsening  To ER if emergent sx as reviewed        MICHEL Tran CNP      Subjective   Isidro is a 29 year old, presenting for the following health issues:  Back Pain    HPI   Low back pain both sides with no radiation, feels stiff sore X 1 week.   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   Had leftover flexeril and ibuprofen/bed rest.   No red flag symptoms.   Pain currently 8/10.       Objective         Vitals:  No vitals were obtained today due to virtual visit.    Physical Exam   GENERAL: alert and no distress  SKIN: Visible skin clear.   NEURO: Cranial nerves grossly intact.  Mentation and speech appropriate for age.  PSYCH: Appropriate affect, tone, and pace of words  BACK:         Video-Visit Details  Time started: 4:30 pm  Time ended: 4:40 PM  Type of service:  Video Visit   Originating Location (pt. Location): Home    Distant Location (provider location):  Off-site  Platform used for Video Visit: Ld  Signed Electronically by: MICHEL Tran CNP

## 2024-09-26 NOTE — LETTER
September 26, 2024      Marya Mtz  [address] [facility] [address]  [place] [address]        To Whom It May Concern:    Marya Mtz  was seen on 9/26/24.  Please excuse her  until 9/27-9/29 due to back pain.        Sincerely,        MICHEL Tran CNP

## 2024-10-27 ENCOUNTER — VIRTUAL VISIT (OUTPATIENT)
Dept: URGENT CARE | Facility: CLINIC | Age: 29
End: 2024-10-27
Payer: COMMERCIAL

## 2024-10-27 DIAGNOSIS — R30.0 DYSURIA: Primary | ICD-10-CM

## 2024-10-27 PROCEDURE — 99213 OFFICE O/P EST LOW 20 MIN: CPT | Mod: 95

## 2024-10-27 RX ORDER — SULFAMETHOXAZOLE AND TRIMETHOPRIM 800; 160 MG/1; MG/1
1 TABLET ORAL 2 TIMES DAILY
Qty: 6 TABLET | Refills: 0 | Status: SHIPPED | OUTPATIENT
Start: 2024-10-27 | End: 2024-10-30

## 2024-10-27 NOTE — PROGRESS NOTES
"  Marya Mtz is a 29 year old female who is being evaluated via a billable video visit.      The patient has been notified of following at the time of scheduling video visit:     \"This video visit will be conducted via a video call between you and your physician/provider. We have found that certain health care needs can be provided without the need for a physical exam.  This service lets us provide the care you need with a video conversation.  If a prescription is necessary we can send it directly to your pharmacy.  If lab work is needed we can place an order for that and you can then stop by our lab to have the test done at a later time.\"   Patient has given consent for video visit?  YES    SUBJECTIVE:  Marya Mtz is an 29 year old female who presents for urine concern.  Yesterday started having increased urinary frequency, urgency, and pain with urination.  Has persisted.  Feels like a uti to her.  No fevers.  No new back pain.  No abdominal pain.  No concerns for stds.  No vaginal discharge.  No n/v/d.  No meds taken for sxs.  No blood in urine.    PMH:   has no past medical history on file.  Patient Active Problem List   Diagnosis    ADHD (attention deficit hyperactivity disorder), combined type     Social History     Socioeconomic History    Marital status: Single   Tobacco Use    Smoking status: Never    Smokeless tobacco: Never   Vaping Use    Vaping status: Former    Substances: Nicotine    Devices: Disposable     Social Drivers of Health     Financial Resource Strain: Low Risk  (1/23/2024)    Financial Resource Strain     Within the past 12 months, have you or your family members you live with been unable to get utilities (heat, electricity) when it was really needed?: No   Food Insecurity: Low Risk  (1/23/2024)    Food Insecurity     Within the past 12 months, did you worry that your food would run out before you got money to buy more?: No     Within the past 12 months, did the food you bought just not " last and you didn t have money to get more?: No   Transportation Needs: Low Risk  (1/23/2024)    Transportation Needs     Within the past 12 months, has lack of transportation kept you from medical appointments, getting your medicines, non-medical meetings or appointments, work, or from getting things that you need?: No    [facility]    Social Connections   Housing Stability: Low Risk  (1/23/2024)    Housing Stability     Do you have housing? : Yes     Are you worried about losing your housing?: No     No family history on file.    ALLERGIES:  Patient has no known allergies.    Current Outpatient Medications   Medication Sig Dispense Refill    amphetamine-dextroamphetamine (ADDERALL XR) 10 MG 24 hr capsule Take 1 capsule (10 mg) by mouth daily 30 capsule 0    escitalopram (LEXAPRO) 10 MG tablet Take 1 tablet (10 mg) by mouth daily 90 tablet 1     No current facility-administered medications for this visit.         ROS:  ROS is done and is negative for general/constitutional, eye, ENT, Respiratory, cardiovascular, GI, , Skin, musculoskeletal except as noted elsewhere.  All other review of systems negative except as noted elsewhere.      OBJECTIVE:    No vital signs obtained as is virtual visit    GENERAL: alert and no distress  EYES: Eyes grossly normal to inspection.  No discharge or erythema, or obvious scleral/conjunctival abnormalities.  RESP: No audible wheeze, cough, or visible cyanosis.    SKIN: Visible skin clear. No significant rash, abnormal pigmentation or lesions.  NEURO: Cranial nerves grossly intact.  Mentation and speech appropriate for age.  PSYCH: Appropriate affect, tone, and pace of words         ASSESSMENT/PLAN:    ASSESSMENT / PLAN:  (R30.0) Dysuria  (primary encounter diagnosis)  Comment: sxs currently c/with uti, so will tx  Plan: sulfamethoxazole-trimethoprim (BACTRIM DS)         800-160 MG tablet         Reviewed medication instructions and side effects. Follow up if experiences side effects.. I reviewed supportive care, otc meds to use if needed, expected course, and signs of concern.  Follow up as needed or if does not improve within 3 day(s) or if worsens in any way.  Reviewed red flag symptoms and is to go to the ER if experiences any of these.  Advised that if sxs not resolve, or if return wtihin 6 weeks, should have a ua done.        See [facility] for orders, medications, letters, patient instructions    Nini Hubbard MD  10/27/2024, 9:19 AM    Video-Visit Details    Video Start Time:  9:20 a    Type of service:  Video Visit    Video End Time: 9:31a    Originating Location (pt. Location): Home    Distant Location (provider location):  [facility]     Platform used for Video Visit: [employer]

## 2024-11-01 ENCOUNTER — VIRTUAL VISIT (OUTPATIENT)
Dept: PSYCHOLOGY | Facility: CLINIC | Age: 29
End: 2024-11-01
Payer: COMMERCIAL

## 2024-11-01 DIAGNOSIS — F32.1 MODERATE MAJOR DEPRESSION (H): Primary | ICD-10-CM

## 2024-11-01 DIAGNOSIS — F43.89 STRESS REACTION, CHRONIC: ICD-10-CM

## 2024-11-01 DIAGNOSIS — F41.1 GAD (GENERALIZED ANXIETY DISORDER): ICD-10-CM

## 2024-11-01 DIAGNOSIS — F90.2 ADHD (ATTENTION DEFICIT HYPERACTIVITY DISORDER), COMBINED TYPE: ICD-10-CM

## 2024-11-01 PROCEDURE — 90834 PSYTX W PT 45 MINUTES: CPT | Mod: 95

## 2024-11-01 ASSESSMENT — PATIENT HEALTH QUESTIONNAIRE - PHQ9
10. IF YOU CHECKED OFF ANY PROBLEMS, HOW DIFFICULT HAVE THESE PROBLEMS MADE IT FOR YOU TO DO YOUR WORK, TAKE CARE OF THINGS AT HOME, OR GET ALONG WITH OTHER PEOPLE: NOT DIFFICULT AT ALL
SUM OF ALL RESPONSES TO PHQ QUESTIONS 1-9: 7
SUM OF ALL RESPONSES TO PHQ QUESTIONS 1-9: 7

## 2024-11-01 ASSESSMENT — ANXIETY QUESTIONNAIRES
5. BEING SO RESTLESS THAT IT IS HARD TO SIT STILL: NOT AT ALL
3. WORRYING TOO MUCH ABOUT DIFFERENT THINGS: NOT AT ALL
2. NOT BEING ABLE TO STOP OR CONTROL WORRYING: NOT AT ALL
GAD7 TOTAL SCORE: 0
1. FEELING NERVOUS, ANXIOUS, OR ON EDGE: NOT AT ALL
7. FEELING AFRAID AS IF SOMETHING AWFUL MIGHT HAPPEN: NOT AT ALL
8. IF YOU CHECKED OFF ANY PROBLEMS, HOW DIFFICULT HAVE THESE MADE IT FOR YOU TO DO YOUR WORK, TAKE CARE OF THINGS AT HOME, OR GET ALONG WITH OTHER PEOPLE?: NOT DIFFICULT AT ALL
6. BECOMING EASILY ANNOYED OR IRRITABLE: NOT AT ALL
7. FEELING AFRAID AS IF SOMETHING AWFUL MIGHT HAPPEN: NOT AT ALL
4. TROUBLE RELAXING: NOT AT ALL
GAD7 TOTAL SCORE: 0
GAD7 TOTAL SCORE: 0

## 2024-11-01 NOTE — PROGRESS NOTES
"[facility]   Mental Health & Addiction Services     Progress Note    Patient Name: Marya Mtz   \"Isidro\"  Date: 24      Service Type:  Individual      Session Start Time: 12:00 PM  Session End Time: 12:45 PM     Session Length: 45 minutes    Session #: 1    Attendees: Client attended alone    Service Modality:  Video Visit:      Provider verified identity through the following two step process.  Patient provided:  Patient  and Patient address    Telemedicine Visit: The patient's condition can be safely assessed and treated via synchronous audio and visual telemedicine encounter.      Reason for Telemedicine Visit: Patient convenience (e.g. access to timely appointments / distance to available provider)    Originating Site (Patient Location): Patient's home    Distant Site (Provider Location): Provider Remote Setting- Home Office    Consent:  The patient/guardian has verbally consented to: the potential risks and benefits of telemedicine (video visit) versus in person care; bill my insurance or make self-payment for services provided; and responsibility for payment of non-covered services.     Patient would like the video invitation sent by:  Send to e-mail at: [email or web address]    Mode of Communication:  Video Conference via [employer]well    Distant Location (Provider):  Off-site    As the provider I attest to compliance with applicable laws and regulations related to telemedicine.    DATA  Crisis: No  Extended Session (53+ minutes): No  Interactive Complexity: No      Progress Since Last Session (Related to Symptoms / Goals / Homework):              Symptoms: No Changes - Patient reported no changes in mood since having her last psychotherapy session with HONG Lee on 24 (patient completed initial DA on 3/19/24 with HONG Fregoso). Patient was referred to current Clinician to begin trauma-specific treatment.     Homework: N/A - First session with patient, homework was " "assigned for Patient to review EMDR handouts. She was also encouraged to use grounding exercises (5-4-3-2-1 Sensory Exercise and Body Scan).                            Episode of Care Goals: N/A - First session.                 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                 Treatment Objective(s) Addressed in This Session:          Establish therapeutic relationship and complete transfer from previous therapist for trauma-specific treatment.                 Intervention:              Clinician spent the majority of the session focused on establishing safety and discussing a trauma-informed approach, along with trauma-specific treatments. Psychoeducation was provided on the impact of trauma on the brain. Patient generally expressed that her goal in therapy is to \"have better associations with these memories.\" Provided space to explore her  experiences in-depth. Patient expressed interest in pursuing EMDR therapy. Clinician provided feedback on patient's KELSIE-II score and explained the EMDR protocol. Session concluded with a review of coping skills (grounding techniques, using the anxiety thermometer, and body scan).  MI: open ended questions, affirmations, reflections.  Trauma-Informed Care: creating safety, empowering patient by educating patient on options and giving an active role in care, and transparency about treatment interventions.  Intersectional Therapy Lens: listened to the unique experiences of patient's identities and gained insight into equality and inequalities experienced.  Person Centered: empathy, unconditional positive regard, reflective listening, non-directive approach, " genuineness, created a supportive environment, and facilitated emotional expression.     Assessments completed prior to visit:  PHQ9:       3/7/2024    10:57 AM 3/19/2024     8:44 AM 5/22/2024     2:46 PM 7/24/2024     2:33 PM 11/1/2024    12:01 PM   PHQ-9 SCORE   PHQ-9 Total Score MyChart 6 (Mild depression) 3 (Minimal depression)   7 (Mild depression)   PHQ-9 Total Score 6 3 5 9 7      GAD7:       3/7/2024    10:58 AM 5/9/2024     9:02 AM 7/18/2024     9:03 AM 11/1/2024    12:03 PM   ISABEL-7 SCORE   Total Score 2 (minimal anxiety)   0 (minimal anxiety)   Total Score 2 5 6 0        KELSIE-II:  Total Score on 7/31/24 = 20.71, indicating low levels of dissociative experiences.     ASSESSMENT: Current Emotional / Mental Status (status of significant symptoms):   Risk status (Self / Other harm or suicidal ideation)   Patient denies current fears or concerns for personal safety.   Patient denies current or recent suicidal ideation or behaviors.   Patient denies current or recent homicidal ideation or behaviors.   Patient denies current or recent self injurious behavior or ideation.   Patient denies other safety concerns.   Patient reports there has been no change in risk factors since her last session.     Patient reports there has been no change in protective factors since her last session.     Recommended that patient call 911 or go to the local ED should there be a change in any of these risk factors     Appearance:   Appropriate    Eye Contact:   Good    Psychomotor Behavior: Normal    Attitude:   Cooperative  Friendly Pleasant   Orientation:   All   Speech    Rate / Production: Talkative    Volume:  Normal    Mood:    Sad    Affect:    Appropriate    Thought Content:  Hopeful, optimistic about starting trauma-specific treatment   Thought Form:  Coherent  Logical    Insight:    Good      Medication Review:   No changes to current psychiatric medication(s)     Medication Compliance:   Yes     Changes in Health  Issues:   Yes: Patient reported lower back pain, which is associated with psychological distress. She met with her PCP on 9/26/24 and reported a decrease in pain. She declined any additional referrals at this time.     Chemical Use Review:   Substance Use: Chemical use reviewed, no active concerns identified      Tobacco Use: No current tobacco use.      Diagnosis:  Major Depressive Disorder, Single Episode, Moderate   Attention-Deficit/Hyperactivity Disorder, Combined presentation  Generalized Anxiety Disorder  Other Specified Trauma- and Stressor-Related Disorder    Collateral Reports Completed:  Telehealth Consent    PLAN: (Patient Tasks / Therapist Tasks / Other)  Patient will review the EMDR handouts and use grounding exercises to help her stay present. Treatment Plan will be completed in the next session. Follow-up appointment scheduled on 11/11/24.    Sandra Bradley PsyD, Postdoctoral Fellow    Clinical supervision and documentation review provided by Leonor Henry, PhD LP  11/1/24

## 2024-11-02 ASSESSMENT — ANXIETY QUESTIONNAIRES: GAD7 TOTAL SCORE: 0

## 2024-11-06 ENCOUNTER — VIRTUAL VISIT (OUTPATIENT)
Dept: FAMILY MEDICINE | Facility: CLINIC | Age: 29
End: 2024-11-06
Payer: COMMERCIAL

## 2024-11-06 DIAGNOSIS — F41.9 ANXIETY: ICD-10-CM

## 2024-11-06 DIAGNOSIS — Z97.5 NEXPLANON IN PLACE: ICD-10-CM

## 2024-11-06 DIAGNOSIS — F90.0 ATTENTION DEFICIT HYPERACTIVITY DISORDER (ADHD), PREDOMINANTLY INATTENTIVE TYPE: Primary | ICD-10-CM

## 2024-11-06 DIAGNOSIS — M54.50 ACUTE BILATERAL LOW BACK PAIN WITHOUT SCIATICA: ICD-10-CM

## 2024-11-06 PROCEDURE — 99214 OFFICE O/P EST MOD 30 MIN: CPT | Mod: 95 | Performed by: FAMILY MEDICINE

## 2024-11-06 RX ORDER — DEXTROAMPHETAMINE SACCHARATE, AMPHETAMINE ASPARTATE MONOHYDRATE, DEXTROAMPHETAMINE SULFATE AND AMPHETAMINE SULFATE 3.75; 3.75; 3.75; 3.75 MG/1; MG/1; MG/1; MG/1
15 CAPSULE, EXTENDED RELEASE ORAL DAILY
Qty: 30 CAPSULE | Refills: 0 | Status: SHIPPED | OUTPATIENT
Start: 2024-12-06 | End: 2025-01-05

## 2024-11-06 RX ORDER — DEXTROAMPHETAMINE SACCHARATE, AMPHETAMINE ASPARTATE MONOHYDRATE, DEXTROAMPHETAMINE SULFATE AND AMPHETAMINE SULFATE 3.75; 3.75; 3.75; 3.75 MG/1; MG/1; MG/1; MG/1
15 CAPSULE, EXTENDED RELEASE ORAL DAILY
Qty: 30 CAPSULE | Refills: 0 | Status: SHIPPED | OUTPATIENT
Start: 2025-01-05 | End: 2025-02-04

## 2024-11-06 RX ORDER — ESCITALOPRAM OXALATE 10 MG/1
10 TABLET ORAL DAILY
Qty: 90 TABLET | Refills: 1 | Status: SHIPPED | OUTPATIENT
Start: 2024-11-06

## 2024-11-06 RX ORDER — DEXTROAMPHETAMINE SACCHARATE, AMPHETAMINE ASPARTATE MONOHYDRATE, DEXTROAMPHETAMINE SULFATE AND AMPHETAMINE SULFATE 3.75; 3.75; 3.75; 3.75 MG/1; MG/1; MG/1; MG/1
15 CAPSULE, EXTENDED RELEASE ORAL DAILY
Qty: 30 CAPSULE | Refills: 0 | Status: SHIPPED | OUTPATIENT
Start: 2024-11-06 | End: 2024-12-06

## 2024-11-06 NOTE — PROGRESS NOTES
Isidro is a 29 year old who is being evaluated via a billable video visit.    How would you like to obtain your AVS? MyChart  If the video visit is dropped, the invitation should be resent by: Text to cell phone: [phone]  Will anyone else be joining your video visit? No      Assessment & Plan     Anxiety  Her anxiety is well controlled on the 10 mg of lexapro , denies any side effects and I have refilled it   - escitalopram (LEXAPRO) 10 MG tablet; Take 1 tablet (10 mg) by mouth daily.  Pt has started  doing EMDR sessions with a therapist and will be following up with these   She is working and also going to school for      Attention deficit hyperactivity disorder (ADHD), predominantly inattentive type  She is doing well on the Adderall XR 15 mg and she only uses it when she is at work or in school and has some days off the medication   - amphetamine-dextroamphetamine (ADDERALL XR) 15 MG 24 hr capsule; Take 1 capsule (15 mg) by mouth daily.  - amphetamine-dextroamphetamine (ADDERALL XR) 15 MG 24 hr capsule; Take 1 capsule (15 mg) by mouth daily.  - amphetamine-dextroamphetamine (ADDERALL XR) 15 MG 24 hr capsule; Take 1 capsule (15 mg) by mouth daily.    Acute bilateral low back pain without sciatica  She injured low back end of September , felt locked and was seen at  and got a muscle relaxer . She has been doing her own stretching exercises since then an dit is starting to feel better , she is now back to walking and doing exercises . If it does not get completely better will come for a follow up in clinic with me to assess       RTC if no improving or worsening.  Pt is aware  and comfortable with the current plan.      Subjective   Isidro is a 29 year old, presenting for the following health issues:  No chief complaint on file.    Anxiety   ADHD   Low back pain   HPI         Review of Systems  Constitutional, HEENT, cardiovascular, pulmonary, GI, , musculoskeletal, neuro, skin, endocrine and psych  systems are negative, except as otherwise noted.      Objective           Vitals:  No vitals were obtained today due to virtual visit.    Physical Exam   GENERAL: alert and no distress  EYES: Eyes grossly normal to inspection.  No discharge or erythema, or obvious scleral/conjunctival abnormalities.  RESP: No audible wheeze, cough, or visible cyanosis.    SKIN: Visible skin clear. No significant rash, abnormal pigmentation or lesions.  NEURO: Cranial nerves grossly intact.  Mentation and speech appropriate for age.  PSYCH: Appropriate affect, tone, and pace of words    No results found for this or any previous visit (from the past 24 hours).      Video-Visit Details    Type of service:  Video Visit   Originating Location (pt. Location): Home    Distant Location (provider location):  On-site  Platform used for Video Visit: Ld  Signed Electronically by: Hannah Freedman MD

## 2024-12-09 ENCOUNTER — VIRTUAL VISIT (OUTPATIENT)
Dept: PSYCHOLOGY | Facility: CLINIC | Age: 29
End: 2024-12-09
Payer: COMMERCIAL

## 2024-12-09 DIAGNOSIS — F43.89 STRESS REACTION, CHRONIC: ICD-10-CM

## 2024-12-09 DIAGNOSIS — F90.2 ADHD (ATTENTION DEFICIT HYPERACTIVITY DISORDER), COMBINED TYPE: ICD-10-CM

## 2024-12-09 DIAGNOSIS — F41.1 GAD (GENERALIZED ANXIETY DISORDER): ICD-10-CM

## 2024-12-09 DIAGNOSIS — F32.1 MODERATE MAJOR DEPRESSION (H): Primary | ICD-10-CM

## 2024-12-09 PROCEDURE — 90834 PSYTX W PT 45 MINUTES: CPT | Mod: 95

## 2024-12-09 ASSESSMENT — ANXIETY QUESTIONNAIRES
4. TROUBLE RELAXING: NOT AT ALL
2. NOT BEING ABLE TO STOP OR CONTROL WORRYING: NOT AT ALL
7. FEELING AFRAID AS IF SOMETHING AWFUL MIGHT HAPPEN: NOT AT ALL
8. IF YOU CHECKED OFF ANY PROBLEMS, HOW DIFFICULT HAVE THESE MADE IT FOR YOU TO DO YOUR WORK, TAKE CARE OF THINGS AT HOME, OR GET ALONG WITH OTHER PEOPLE?: NOT DIFFICULT AT ALL
1. FEELING NERVOUS, ANXIOUS, OR ON EDGE: SEVERAL DAYS
GAD7 TOTAL SCORE: 2
6. BECOMING EASILY ANNOYED OR IRRITABLE: NOT AT ALL
3. WORRYING TOO MUCH ABOUT DIFFERENT THINGS: SEVERAL DAYS
5. BEING SO RESTLESS THAT IT IS HARD TO SIT STILL: NOT AT ALL
GAD7 TOTAL SCORE: 2
7. FEELING AFRAID AS IF SOMETHING AWFUL MIGHT HAPPEN: NOT AT ALL
GAD7 TOTAL SCORE: 2

## 2024-12-09 ASSESSMENT — PATIENT HEALTH QUESTIONNAIRE - PHQ9
SUM OF ALL RESPONSES TO PHQ QUESTIONS 1-9: 3
10. IF YOU CHECKED OFF ANY PROBLEMS, HOW DIFFICULT HAVE THESE PROBLEMS MADE IT FOR YOU TO DO YOUR WORK, TAKE CARE OF THINGS AT HOME, OR GET ALONG WITH OTHER PEOPLE: NOT DIFFICULT AT ALL
SUM OF ALL RESPONSES TO PHQ QUESTIONS 1-9: 3

## 2024-12-09 NOTE — PROGRESS NOTES
"[facility]   Mental Health & Addiction Services     Progress Note    Patient Name: Marya Mtz   \"Isidro\"  Date: 24      Service Type:  Individual      Session Start Time: 11:00 AM  Session End Time: 11:50 AM     Session Length: 50 minutes    Session #: 4    Attendees: Client attended alone    Service Modality:  Video Visit:      Provider verified identity through the following two step process.  Patient provided:  Patient  and Patient address    Telemedicine Visit: The patient's condition can be safely assessed and treated via synchronous audio and visual telemedicine encounter.      Reason for Telemedicine Visit: Patient convenience (e.g. access to timely appointments / distance to available provider)    Originating Site (Patient Location): Patient's home    Distant Site (Provider Location): Provider Remote Setting- Home Office    Consent:  The patient/guardian has verbally consented to: the potential risks and benefits of telemedicine (video visit) versus in person care; bill my insurance or make self-payment for services provided; and responsibility for payment of non-covered services.     Patient would like the video invitation sent by:  Send to e-mail at: [email or web address]    Mode of Communication:  Video Conference via Amwell    Distant Location (Provider):  Off-site    As the provider I attest to compliance with applicable laws and regulations related to telemedicine.    DATA  Crisis: No  Extended Session (53+ minutes): No  Interactive Complexity: No        Progress Since Last Session (Related to Symptoms / Goals / Homework):              Symptoms: Improving - Patient reported an improvement in mood since the previous session on 24, attributing it to \"a lot of growth and working on things I can control,\" specifically highlighting improvements in nutrition intake and time management.    Homework: Partially Completed - Patient engaged in behavioral activation to support " mood improvement. While she was unable to take her driving permit test due to a lack of transportation, she plans to continue preparing for the test by studying the material and practicing anxiety management strategies.                Episode of Care Goals: Satisfactory progress - ACTION (Actively working towards change); Intervened by reinforcing change plan / affirming steps taken                Current / Ongoing Stressors and Concerns:  History of MH Difficulties, History of ADHD, Served in  ([id number]) Non-Combat (4 State Activations, 1 [place]verse[place] Deployment in [facility]), Loss/Grief (3 Unit Members Passed Away), Contextual Issues, and System of Oppression (Experienced Racial Microaggressions).                 Treatment Objective(s) Addressed in This Session:          Patient will increase interest, engagement, and pleasure in doing things.  Patient will decrease frequency and intensity of feeling down, depressed, hopeless.        Patient will identify the connection between anxiety and self-talk.  Patient will disrupt dysfunctional thoughts by increasing awareness for internal self-talk through relaxation, exercise, or other positive activities.  Patient will learn how safety and security in her body looks and in relationships.                Intervention:         Started session exploring the patient's recent experiences, including her thoughts, feelings, behaviors, and notable events from the past few weeks. Clinician positively reinforced the patient s progress and her use of effective behavioral changes, such as setting timers and focusing on consuming more nutrient-dense foods to support brain health. The impact of these health and lifestyle adjustments on her overall mood and ADHD symptoms was discussed and highlighted. Clinician revisited anxiety management strategies, including guided imagery and breathing techniques, in preparation for the patient s upcoming driving permit  test. Clinician met the patient needs in session by supporting her desire to process interpersonal relationships, particularly concerns about friends taking financial advantage of her. Clinician provided psychoeducation on the DBT skill DEAR MAN, aimed at helping the patient assertively request what she needs while maintaining her relationships. Specific examples were discussed, such as asking a friend to pay for an Uber instead of taking on the financial burden herself. Ended session with modeling and practicing assertiveness skills using DEAR MAN, allowing the patient to build confidence in handling similar situations moving forward.    Assessments completed prior to visit:  PHQ9:       3/7/2024    10:57 AM 3/19/2024     8:44 AM 5/22/2024     2:46 PM 7/24/2024     2:33 PM 11/1/2024    12:01 PM 11/22/2024     9:26 AM 12/9/2024    10:45 AM   PHQ-9 SCORE   PHQ-9 Total Score MyChart 6 (Mild depression) 3 (Minimal depression)   7 (Mild depression) 5 (Mild depression) 3 (Minimal depression)   PHQ-9 Total Score 6 3 5 9 7  5  3       GAD7:       3/7/2024    10:58 AM 5/9/2024     9:02 AM 7/18/2024     9:03 AM 11/1/2024    12:03 PM 11/22/2024     9:29 AM 12/9/2024    10:46 AM   ISABEL-7 SCORE   Total Score 2 (minimal anxiety)   0 (minimal anxiety) 2 (minimal anxiety) 2 (minimal anxiety)   Total Score 2 5 6 0  2  2         TSQ:  Upsetting thoughts or memories about the event that have come into your mind against your will: No  Upsetting dreams about the event: No  Acting or feeling as though the event were happening again: No  Feeling upset by reminders of the event: No  Bodily reactions (such as fast heartbeat, stomach churning, sweatiness, dizziness) when reminded of the event: No  Difficulty falling or staying asleep: No  Irritability or outbursts of anger: No  Difficulty concentrating: Yes  Heightened awareness of potential dangers to yourself and others: Yes  Being jumpy or being startled at something unexpected:  Yes  *Total Score on 11/22/24 = 3, indicating a low-risk of having PTSD according to the DSM and instead subclinical trauma-related symptoms.    KELSIE-II:  Total Score on 7/31/24 = 20.71, indicating low levels of dissociative experiences.     ASSESSMENT: Current Emotional / Mental Status (status of significant symptoms):   Risk status (Self / Other harm or suicidal ideation)   Patient denies current fears or concerns for personal safety.   Patient denies current or recent suicidal ideation or behaviors.   Patient denies current or recent homicidal ideation or behaviors.   Patient denies current or recent self injurious behavior or ideation.   Patient denies other safety concerns.   Patient reports there has been no change in risk factors since her last session.     Patient reports there has been no change in protective factors since her last session.     Recommended that patient call 911 or go to the local ED should there be a change in any of these risk factors     Appearance:   Appropriate    Eye Contact:   Good    Psychomotor Behavior: Normal    Attitude:   Cooperative  Friendly Pleasant   Orientation:   All   Speech    Rate / Production: Talkative    Volume:  Normal    Mood:    Anxious   Affect:    Worried about initiating conversations with friends   Thought Content:  Thoughts of guilt about being assertive and people pleasing   Thought Form:  Coherent  Logical    Insight:    Good      Medication Review:   No changes to current psychiatric medication(s)     Medication Compliance:   Yes     Changes in Health Issues:   None reported     Chemical Use Review:   Substance Use: Chemical use reviewed, no active concerns identified      Tobacco Use: No current tobacco use.      Diagnosis:  Major Depressive Disorder, Single Episode, Moderate   Attention-Deficit/Hyperactivity Disorder, Combined presentation  Generalized Anxiety Disorder  Other Specified Trauma- and Stressor-Related Disorder    Collateral Reports  "Completed:  Telehealth Consent      PLAN: (Patient Tasks / Therapist Tasks / Other)  Patient will continue focusing on engaging in behavioral activation to improve her mood (start a swimming class, complete puzzle board, use a Happy Lamp, connect with friends). She will also continue preparing for her driving permit test by studying materials, practicing guided imagery, and using anxiety coping skills, such as Opposite Action, to manage arousal levels during the exam. She will start practicing the DEAR MAN technique in real-life situations where she needs to ask for something, set a boundary, or maintain a relationship. Follow-up appointment scheduled on 1/3/24.    Sandra Bradley PsyD, Postdoctoral Fellow    Clinical supervision and documentation review provided by Leonor Henry, PhD LP  12/9/24  ______________________________________________________________________    Individual Treatment Plan    Patient's Name: Marya Mtz  \"Isidro\"  YOB: 1995    Date of Creation: 11/22/24    DSM5 Diagnoses: Major Depressive Disorder, Single Episode, Moderate, Attention-Deficit/Hyperactivity Disorder, Combined presentation, Generalized Anxiety Disorder, and Other Specified Trauma- and Stressor-Related Disorder  Psychosocial / Contextual Factors: History of MH Difficulties, History of ADHD, Served in  ([id number]) Non-Combat (4 State Activations, 1 Overseas Deployment in [facility]), Loss/Grief (3 Unit Members Passed Away), Contextual Issues, and System of Oppression (Experienced Racial Microaggressions).    Referral / Collaboration:  Referral to another professional/service is not indicated at this time.    Anticipated number of session for this episode of care: ~ 20-26 sessions  Anticipation frequency of session: Bi-Weekly  Anticipated Duration of each session: 38-52 minutes  Treatment plan will be reviewed in 90 days or when goals have been changed.     MeasurableTreatment Goal(s) related to " "diagnosis / functional impairment(s)  Goal 1: Patient will improve mood and lower depression levels, as measured by the PHQ-9.    I will know I've met my goal when I get back into my solo hobbies and reaching out to friends and loved ones more.\"    Objective #A (Patient Action)   Patient will increase interest, engagement, and pleasure in doing things.  Patient will decrease frequency and intensity of feeling down, depressed, hopeless.        Patient will Identify negative self-talk and behaviors: challenge core beliefs, myths, and actions.  Status: New - Date: 11/22/2024     Intervention(s)  Therapist will teach the CBT four-factor model, including analyzing automatic thoughts, uncovering core beliefs, disputing irrational thoughts, discussing functional analyses, increasing acceptance, and engaging in problem solving.  Therapist will provide psychoeducation on the 4P Model (predisposing, precipitating, perpetuating, and protective factors).  Therapist will assign homework for BA and help patient identify values that align/do not align with current daily activities.  Therapist will teach the principles of MBSR and assign patient mindfulness exercises.    Goal 2: Patient will work towards developing effective strategies for managing ADHD symptoms, enhancing focus, improving organizational and time-management skills, and building coping mechanisms to reduce hyperactivity/impulsivity.    I will know I've met my goal when I give myself elvis for breaking down tasks and developing more time awareness.      Objective #A (Patient Action).           Patient will identify and use 4 strategies to improve organization.  Patient will implement 4 procrastination strategies.  Patient will identify and compliment positives at least 2 times daily to support positive self-image.  Patient will identify 2 ways that ADHD causes difficulties interpersonally and at work.  Status: New - Date: 11/22/2024     Intervention(s)  Therapist " "will use action-oriented therapies, such as Behavioral Therapy, CBT, and DBT to help manage attention and hyperactivity symptoms.  Therapist will use self-instruction therapy to help patient learn how to \"talk herself through\" a task or activity (e.g., Stop Think Do).  Therapist will provide additional materials on ADHD resources (books, podcasts, websites).    Goal 3: Patient will learn coping skills and lower anxiety levels, as measured by the ISABEL-7.    I will know I've met my goal when I can identify the anxiety, manage it, and then move on.      Objective #A (Patient Action)  Patient will identify negative statements that she makes to herself.  Patient will identify the connection between anxiety and self-talk.  Patient will disrupt dysfunctional thoughts by increasing awareness for internal self-talk through relaxation, exercise, or other positive activities.  Patient will use thought-stopping strategy daily to reduce intrusive thoughts.  Status: New - Date: 11/22/2024     Intervention(s)  Therapist will provide psychoeducation on CBT and skills for cognitive restructuring.    Goal 4: Patient will focus on healing from her adverse and negative experiences, developing the ability to live fully in the present, and creating a positive future with healthy, supportive relationships.    I will know I've met my goal when I have better associations with these memories and not let my past experiences dictate my life.\"    Objective #A (Patient Action).           Patient will learn how safety and security in her body looks and in relationships.  Patient will use grounding techniques.  Patient will learn about trauma-related treatment (education of PTSD, protocol of EMDR, AIP model, 8 phases)  Patient will identify past memory, present triggers, and future responses following the AIP model.  Patient will participate in sessions of prolonged exposure or EMDR.  Patient will identify three distraction and diversion activities " and use those activities to decrease level of anxiety from flashbacks.  Status: New - Date: 11/22/2024     Intervention(s)  Therapist will use a Trauma-Informed Care (Trauma-Focused ACT, EMDR, PE-PC, and IFS-Informed) approach by creating safety, empowering patient by educating her on options and giving her an active role in care, and transparency about treatment interventions.    Therapist will provide EMDR, psychoeducation, behavioral activation, and cognitive restructuring.  Therapist will provide education materials on childhood and adult attachment styles.  Therapist will teach patient about her own Internal Family System (IFS), including managers, firefighters, and exiles to better break through defenses and develop an integrated sense of self.  Therapist will provide education on Emotion Body Mapping.     Patient has reviewed and agreed to the above plan.    Sandra Bradley PsyD, Postdoctoral Fellow November 22, 2024

## 2024-12-10 ASSESSMENT — ANXIETY QUESTIONNAIRES: GAD7 TOTAL SCORE: 2

## 2025-01-16 ENCOUNTER — VIRTUAL VISIT (OUTPATIENT)
Dept: PSYCHOLOGY | Facility: CLINIC | Age: 30
End: 2025-01-16
Payer: COMMERCIAL

## 2025-01-16 DIAGNOSIS — F41.1 GAD (GENERALIZED ANXIETY DISORDER): ICD-10-CM

## 2025-01-16 DIAGNOSIS — F32.1 MODERATE MAJOR DEPRESSION (H): Primary | ICD-10-CM

## 2025-01-16 DIAGNOSIS — F43.89 STRESS REACTION, CHRONIC: ICD-10-CM

## 2025-01-16 DIAGNOSIS — F90.2 ADHD (ATTENTION DEFICIT HYPERACTIVITY DISORDER), COMBINED TYPE: ICD-10-CM

## 2025-01-16 ASSESSMENT — ANXIETY QUESTIONNAIRES
2. NOT BEING ABLE TO STOP OR CONTROL WORRYING: NOT AT ALL
6. BECOMING EASILY ANNOYED OR IRRITABLE: NOT AT ALL
7. FEELING AFRAID AS IF SOMETHING AWFUL MIGHT HAPPEN: NOT AT ALL
7. FEELING AFRAID AS IF SOMETHING AWFUL MIGHT HAPPEN: NOT AT ALL
GAD7 TOTAL SCORE: 1
1. FEELING NERVOUS, ANXIOUS, OR ON EDGE: NOT AT ALL
GAD7 TOTAL SCORE: 1
GAD7 TOTAL SCORE: 1
3. WORRYING TOO MUCH ABOUT DIFFERENT THINGS: SEVERAL DAYS
8. IF YOU CHECKED OFF ANY PROBLEMS, HOW DIFFICULT HAVE THESE MADE IT FOR YOU TO DO YOUR WORK, TAKE CARE OF THINGS AT HOME, OR GET ALONG WITH OTHER PEOPLE?: NOT DIFFICULT AT ALL
5. BEING SO RESTLESS THAT IT IS HARD TO SIT STILL: NOT AT ALL
4. TROUBLE RELAXING: NOT AT ALL

## 2025-01-16 ASSESSMENT — PATIENT HEALTH QUESTIONNAIRE - PHQ9
SUM OF ALL RESPONSES TO PHQ QUESTIONS 1-9: 5
10. IF YOU CHECKED OFF ANY PROBLEMS, HOW DIFFICULT HAVE THESE PROBLEMS MADE IT FOR YOU TO DO YOUR WORK, TAKE CARE OF THINGS AT HOME, OR GET ALONG WITH OTHER PEOPLE: NOT DIFFICULT AT ALL
SUM OF ALL RESPONSES TO PHQ QUESTIONS 1-9: 5

## 2025-01-16 NOTE — PROGRESS NOTES
"[facility]   Mental Health & Addiction Services     Progress Note    Patient Name: Marya Mtz   \"Isidro\"  Date: 1/16/25      Service Type:  Individual      Session Start Time: 12:00 PM  Session End Time: 12:48 PM     Session Length: 48 minutes    Session #: 5    Attendees: Client attended alone     Service Modality:  Video Visit:      Provider verified identity through the following two step process.  Patient provided:  Patient is known previously to provider    Telemedicine Visit: The patient's condition can be safely assessed and treated via synchronous audio and visual telemedicine encounter.      Reason for Telemedicine Visit: Patient convenience (e.g. 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     Patient would like the video invitation sent by:  Send to e-mail at: [email or web address]    Mode of Communication:  Video Conference via [employer]    Distant Location (Provider):  Off-site    As the provider I attest to compliance with applicable laws and regulations related to telemedicine.    DATA  Crisis: No  Extended Session (53+ minutes): No  Interactive Complexity: No      Progress Since Last Session (Related to Symptoms / Goals / Homework):              Symptoms: Improving - Patient reported improved mood since the previous session on 12/9/24, attributing it to \"growing a ton of awareness, making sense of my emotions more, and being able to express them.\"    Homework: Partially Completed - Patient engaged in behavioral activation to support mood " "improvement. She practiced the DEAR MAN technique in real-life situations where she needs to ask for something, set a boundary, or maintain a relationship. Patient also found additional DEAR MAN worksheets to practice the skill.                Episode of Care Goals: Satisfactory progress - ACTION (Actively working towards change); Intervened by reinforcing change plan / affirming steps taken                Current / Ongoing Stressors and Concerns:  History of MH Difficulties, History of ADHD, Served in  ([id number]) Non-Combat (4 State Activations, 1 [place]verseas Deployment in [facility]), Loss/Grief (3 Unit Members Passed Away), Contextual Issues, and System of Oppression (Experienced Racial Microaggressions).                 Treatment Objective(s) Addressed in This Session:          Patient will increase interest, engagement, and pleasure in doing things.  Patient will decrease frequency and intensity of feeling down, depressed, hopeless.        Patient will learn how safety and security in her body looks.  Patient will use grounding techniques.  Patient will identify and use strategies to improve organization.  Patient will implement procrastination strategies.                Intervention:  Session began by exploring the patient s recent experiences, including her thoughts, feelings, behaviors, and notable events from the past few weeks. Clinician reinforced the patient s progress, particularly her effective use of behavioral changes and interpersonal effectiveness skills. Discussion focused on her growing emotional awareness, noting how she \"could not express my emotions in childhood,\" and the frustration she sometimes feels when identifying or labeling feelings. With cultural sensitivity and an intersectionality lens, clinician provided psychoeducation on a study mapping emotions universally across cultures. Patient expressed interest in this approach, noting anger manifests in her hands " and anxiety in her chest. This led to a discussion about her acculturation experience, including adapting to [place] cultural norms (e.g., eye contact in the  and school) while maintaining her own cultural practices, such as respecting personal space. She shared how these cultural differences, coupled with ADHD, sometimes cause communication-related distress. Ended the session by providing strategies to address ADHD-related difficulties.    Assessments completed prior to visit:  PHQ9:       5/22/2024     2:46 PM 7/24/2024     2:33 PM 11/1/2024    12:01 PM 11/22/2024     9:26 AM 12/9/2024    10:45 AM 1/2/2025    12:46 PM 1/16/2025    11:54 AM   PHQ-9 SCORE   PHQ-9 Total Score MyChart   7 (Mild depression) 5 (Mild depression) 3 (Minimal depression) 6 (Mild depression) 5 (Mild depression)   PHQ-9 Total Score 5 9 7  5  3  6  5      GAD7:       5/9/2024     9:02 AM 7/18/2024     9:03 AM 11/1/2024    12:03 PM 11/22/2024     9:29 AM 12/9/2024    10:46 AM 1/2/2025    12:47 PM 1/16/2025    11:55 AM   ISABEL-7 SCORE   Total Score   0 (minimal anxiety) 2 (minimal anxiety) 2 (minimal anxiety) 2 (minimal anxiety) 1 (minimal anxiety)   Total Score 5 6 0  2  2  2  1       TSQ:  Upsetting thoughts or memories about the event that have come into your mind against your will: No  Upsetting dreams about the event: No  Acting or feeling as though the event were happening again: No  Feeling upset by reminders of the event: No  Bodily reactions (such as fast heartbeat, stomach churning, sweatiness, dizziness) when reminded of the event: No  Difficulty falling or staying asleep: No  Irritability or outbursts of anger: No  Difficulty concentrating: Yes  Heightened awareness of potential dangers to yourself and others: Yes  Being jumpy or being startled at something unexpected: Yes  *Total Score on 11/22/24 = 3, indicating a low-risk of having PTSD according to the DSM and instead subclinical trauma-related symptoms.    KELSIE-II:  Total  Score on 7/31/24 = 20.71, indicating low levels of dissociative experiences.     ASSESSMENT: Current Emotional / Mental Status (status of significant symptoms):   Risk status (Self / Other harm or suicidal ideation)   Patient denies current fears or concerns for personal safety.   Patient denies current or recent suicidal ideation or behaviors.   Patient denies current or recent homicidal ideation or behaviors.   Patient denies current or recent self injurious behavior or ideation.   Patient denies other safety concerns.   Patient reports there has been no change in risk factors since her last session.     Patient reports there has been no change in protective factors since her last session.     Recommended that patient call 911 or go to the local ED should there be a change in any of these risk factors     Appearance:   Appropriate    Eye Contact:   Good    Psychomotor Behavior: Normal    Attitude:   Cooperative  Friendly Pleasant   Orientation:   All   Speech    Rate / Production: Talkative    Volume:  Normal    Mood:    Less anxious   Affect:    Appropriate   Thought Content:  Focused on areas for self-improvement and expressing sadness about her difficulty in articulating emotions, reflecting on how this has been a longstanding challenge   Thought Form:  Coherent  Logical    Insight:    Good      Medication Review:   No changes to current psychiatric medication(s)     Medication Compliance:   Yes     Changes in Health Issues:   None reported     Chemical Use Review:   Substance Use: Chemical use reviewed, no active concerns identified      Tobacco Use: No current tobacco use.      Diagnosis:  Major Depressive Disorder, Single Episode, Moderate   Attention-Deficit/Hyperactivity Disorder, Combined presentation  Generalized Anxiety Disorder  Other Specified Trauma- and Stressor-Related Disorder    Collateral Reports Completed:  Telehealth Consent      PLAN: (Patient Tasks / Therapist Tasks / Other)  Patient will  "continue focusing on engaging in behavioral activation to improve her mood. Patient will use mind mapping to identify and label any distress she experiences somatically. She will also practice the provided strategies to address ADHD-related challenges. Follow-up appointment scheduled on 1/30/25.    Sandra Bradley PsyD, Postdoctoral Fellow    Clinical supervision and documentation review provided by Leonor Henry, PhD LP  1/16/25  ______________________________________________________________________    Individual Treatment Plan    Patient's Name: Marya Mtz  \"Isidro\"  YOB: 1995    Date of Creation: 11/22/24    DSM5 Diagnoses: Major Depressive Disorder, Single Episode, Moderate, Attention-Deficit/Hyperactivity Disorder, Combined presentation, Generalized Anxiety Disorder, and Other Specified Trauma- and Stressor-Related Disorder  Psychosocial / Contextual Factors: History of MH Difficulties, History of ADHD, Served in  ([id number]) Non-Combat (4 State Activations, 1 [place]verse[place] Deployment in [facility]), Loss/Grief (3 Unit Members Passed Away), Contextual Issues, and System of Oppression (Experienced Racial Microaggressions).    Referral / Collaboration:  Referral to another professional/service is not indicated at this time.    Anticipated number of session for this episode of care: ~ 20-26 sessions  Anticipation frequency of session: Bi-Weekly  Anticipated Duration of each session: 38-52 minutes  Treatment plan will be reviewed in 90 days or when goals have been changed.     MeasurableTreatment Goal(s) related to diagnosis / functional impairment(s)  Goal 1: Patient will improve mood and lower depression levels, as measured by the PHQ-9.    I will know I've met my goal when I get back into my solo hobbies and reaching out to friends and loved ones more.\"    Objective #A (Patient Action)   Patient will increase interest, engagement, and pleasure in doing things.  Patient will decrease " "frequency and intensity of feeling down, depressed, hopeless.        Patient will Identify negative self-talk and behaviors: challenge core beliefs, myths, and actions.  Status: New - Date: 11/22/2024     Intervention(s)  Therapist will teach the CBT four-factor model, including analyzing automatic thoughts, uncovering core beliefs, disputing irrational thoughts, discussing functional analyses, increasing acceptance, and engaging in problem solving.  Therapist will provide psychoeducation on the 4P Model (predisposing, precipitating, perpetuating, and protective factors).  Therapist will assign homework for BA and help patient identify values that align/do not align with current daily activities.  Therapist will teach the principles of MBSR and assign patient mindfulness exercises.    Goal 2: Patient will work towards developing effective strategies for managing ADHD symptoms, enhancing focus, improving organizational and time-management skills, and building coping mechanisms to reduce hyperactivity/impulsivity.    I will know I've met my goal when I give myself elvis for breaking down tasks and developing more time awareness.      Objective #A (Patient Action).           Patient will identify and use 4 strategies to improve organization.  Patient will implement 4 procrastination strategies.  Patient will identify and compliment positives at least 2 times daily to support positive self-image.  Patient will identify 2 ways that ADHD causes difficulties interpersonally and at work.  Status: New - Date: 11/22/2024     Intervention(s)  Therapist will use action-oriented therapies, such as Behavioral Therapy, CBT, and DBT to help manage attention and hyperactivity symptoms.  Therapist will use self-instruction therapy to help patient learn how to \"talk herself through\" a task or activity (e.g., Stop Think Do).  Therapist will provide additional materials on ADHD resources (books, podcasts, websites).    Goal 3: Patient " "will learn coping skills and lower anxiety levels, as measured by the ISABEL-7.    I will know I've met my goal when I can identify the anxiety, manage it, and then move on.      Objective #A (Patient Action)  Patient will identify negative statements that she makes to herself.  Patient will identify the connection between anxiety and self-talk.  Patient will disrupt dysfunctional thoughts by increasing awareness for internal self-talk through relaxation, exercise, or other positive activities.  Patient will use thought-stopping strategy daily to reduce intrusive thoughts.  Status: New - Date: 11/22/2024     Intervention(s)  Therapist will provide psychoeducation on CBT and skills for cognitive restructuring.    Goal 4: Patient will focus on healing from her adverse and negative experiences, developing the ability to live fully in the present, and creating a positive future with healthy, supportive relationships.    I will know I've met my goal when I have better associations with these memories and not let my past experiences dictate my life.\"    Objective #A (Patient Action).           Patient will learn how safety and security in her body looks and in relationships.  Patient will use grounding techniques.  Patient will learn about trauma-related treatment (education of PTSD, protocol of EMDR, AIP model, 8 phases)  Patient will identify past memory, present triggers, and future responses following the AIP model.  Patient will participate in sessions of prolonged exposure or EMDR.  Patient will identify three distraction and diversion activities and use those activities to decrease level of anxiety from flashbacks.  Status: New - Date: 11/22/2024     Intervention(s)  Therapist will use a Trauma-Informed Care (Trauma-Focused ACT, EMDR, PE-PC, and IFS-Informed) approach by creating safety, empowering patient by educating her on options and giving her an active role in care, and transparency about treatment " interventions.    Therapist will provide EMDR, psychoeducation, behavioral activation, and cognitive restructuring.  Therapist will provide education materials on childhood and adult attachment styles.  Therapist will teach patient about her own Internal Family System (IFS), including managers, firefighters, and exiles to better break through defenses and develop an integrated sense of self.  Therapist will provide education on Emotion Body Mapping.     Patient has reviewed and agreed to the above plan.    Sandra Bradley PsyD, Postdoctoral Fellow November 22, 2024

## 2025-01-30 ENCOUNTER — VIRTUAL VISIT (OUTPATIENT)
Dept: PSYCHOLOGY | Facility: CLINIC | Age: 30
End: 2025-01-30
Payer: COMMERCIAL

## 2025-01-30 DIAGNOSIS — F90.2 ADHD (ATTENTION DEFICIT HYPERACTIVITY DISORDER), COMBINED TYPE: ICD-10-CM

## 2025-01-30 DIAGNOSIS — F43.89 STRESS REACTION, CHRONIC: ICD-10-CM

## 2025-01-30 DIAGNOSIS — F32.1 MODERATE MAJOR DEPRESSION (H): Primary | ICD-10-CM

## 2025-01-30 DIAGNOSIS — F41.1 GAD (GENERALIZED ANXIETY DISORDER): ICD-10-CM

## 2025-01-30 ASSESSMENT — PATIENT HEALTH QUESTIONNAIRE - PHQ9
SUM OF ALL RESPONSES TO PHQ QUESTIONS 1-9: 5
SUM OF ALL RESPONSES TO PHQ QUESTIONS 1-9: 5
10. IF YOU CHECKED OFF ANY PROBLEMS, HOW DIFFICULT HAVE THESE PROBLEMS MADE IT FOR YOU TO DO YOUR WORK, TAKE CARE OF THINGS AT HOME, OR GET ALONG WITH OTHER PEOPLE: NOT DIFFICULT AT ALL

## 2025-01-30 ASSESSMENT — ANXIETY QUESTIONNAIRES
3. WORRYING TOO MUCH ABOUT DIFFERENT THINGS: SEVERAL DAYS
8. IF YOU CHECKED OFF ANY PROBLEMS, HOW DIFFICULT HAVE THESE MADE IT FOR YOU TO DO YOUR WORK, TAKE CARE OF THINGS AT HOME, OR GET ALONG WITH OTHER PEOPLE?: NOT DIFFICULT AT ALL
IF YOU CHECKED OFF ANY PROBLEMS ON THIS QUESTIONNAIRE, HOW DIFFICULT HAVE THESE PROBLEMS MADE IT FOR YOU TO DO YOUR WORK, TAKE CARE OF THINGS AT HOME, OR GET ALONG WITH OTHER PEOPLE: NOT DIFFICULT AT ALL
GAD7 TOTAL SCORE: 2
GAD7 TOTAL SCORE: 2
2. NOT BEING ABLE TO STOP OR CONTROL WORRYING: NOT AT ALL
7. FEELING AFRAID AS IF SOMETHING AWFUL MIGHT HAPPEN: NOT AT ALL
1. FEELING NERVOUS, ANXIOUS, OR ON EDGE: NOT AT ALL
5. BEING SO RESTLESS THAT IT IS HARD TO SIT STILL: SEVERAL DAYS
6. BECOMING EASILY ANNOYED OR IRRITABLE: NOT AT ALL
4. TROUBLE RELAXING: NOT AT ALL
GAD7 TOTAL SCORE: 2
7. FEELING AFRAID AS IF SOMETHING AWFUL MIGHT HAPPEN: NOT AT ALL

## 2025-01-30 NOTE — PROGRESS NOTES
"[facility]   Mental Health & Addiction Services     Progress Note    Patient Name: Marya Mtz   \"Isidro\"  Date: 1/30/25      Service Type:  Individual      Session Start Time: 12:00 PM  Session End Time: 12:45 PM     Session Length: 45 minutes    Session #: 6    Attendees: Client attended alone     Service Modality:  Video Visit:      Provider verified identity through the following two step process.  Patient provided:  Patient is known previously to provider    Telemedicine Visit: The patient's condition can be safely assessed and treated via synchronous audio and visual telemedicine encounter.      Reason for Telemedicine Visit: Patient convenience (e.g. access to timely appointments / distance to available provider)    Originating Site (Patient Location): Patient's home     Distant Site (Provider Location): Provider Remote Setting- Home Office    Consent:  The patient/guardian has verbally consented to: the potential risks and benefits of telemedicine (video visit) versus in person care; bill my insurance or make self-payment for services provided; and responsibility for payment of non-covered services.     Patient would like the video invitation sent by:  Send to e-mail at: [email or web address]    Mode of Communication:  Video Conference via Amwell    Distant Location (Provider):  Off-site    As the provider I attest to compliance with applicable laws and regulations related to telemedicine.    DATA  Crisis: No  Extended Session (53+ minutes): No  Interactive Complexity: No      Progress Since Last Session (Related to Symptoms / Goals / Homework):              Symptoms: Improving - Patient reported ongoing improvement in her mood since the last session on 1/16/25.    Homework: Partially Completed - Patient engaged in behavioral activation to support mood improvement. Patient used mind mapping to identify and label any distress she experiences somatically. She also inconsistently practiced " "strategies to address ADHD-related challenges.                  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                 Treatment Objective(s) Addressed in This Session:          Patient will increase interest, engagement, and pleasure in doing things.  Patient will learn how safety and security in her body looks.  Patient will use grounding techniques.  Patient will identify the connection between anxiety and self-talk.  Patient will disrupt dysfunctional thoughts by increasing awareness for internal self-talk through relaxation, exercise, or other positive activities.                Intervention:  Session focused on increasing the patient's emotional awareness and processing unresolved feelings. Patient identified where emotions manifest physically (chest tightness) and recognized a pattern of \"dismissing my emotions,\" which she is addressing through journaling. With therapeutic support, the patient openly explored the emotions she tends to suppress, particularly anger and resentment linked to her childhood and estranged relationship with her father. We processed underlying emotions beneath the anger while also practicing cognitive flexibility, as the patient acknowledged, \"I can also have elvis for my parents and what they could give me as a child.\" We further explored how expressing her emotions and allowing herself to cry feels \"lifting,\" as she noted, \"I was told not to cry as a kid.\" Clinician reinforced Patient's ability to identify and process emotions, " validating her continued insight and emotional growth.    PHQ9:       7/24/2024     2:33 PM 11/1/2024    12:01 PM 11/22/2024     9:26 AM 12/9/2024    10:45 AM 1/2/2025    12:46 PM 1/16/2025    11:54 AM 1/30/2025    10:45 AM   PHQ-9 SCORE   PHQ-9 Total Score MyChart  7 (Mild depression) 5 (Mild depression) 3 (Minimal depression) 6 (Mild depression) 5 (Mild depression) 5 (Mild depression)   PHQ-9 Total Score 9 7  5  3  6  5  5       GAD7:       7/18/2024     9:03 AM 11/1/2024    12:03 PM 11/22/2024     9:29 AM 12/9/2024    10:46 AM 1/2/2025    12:47 PM 1/16/2025    11:55 AM 1/30/2025    10:46 AM   ISABEL-7 SCORE   Total Score  0 (minimal anxiety) 2 (minimal anxiety) 2 (minimal anxiety) 2 (minimal anxiety) 1 (minimal anxiety) 2 (minimal anxiety)   Total Score 6 0  2  2  2  1  2      TSQ:  Upsetting thoughts or memories about the event that have come into your mind against your will: No  Upsetting dreams about the event: No  Acting or feeling as though the event were happening again: No  Feeling upset by reminders of the event: No  Bodily reactions (such as fast heartbeat, stomach churning, sweatiness, dizziness) when reminded of the event: No  Difficulty falling or staying asleep: No  Irritability or outbursts of anger: No  Difficulty concentrating: Yes  Heightened awareness of potential dangers to yourself and others: Yes  Being jumpy or being startled at something unexpected: Yes  *Total Score on 11/22/24 = 3, indicating a low-risk of having PTSD according to the DSM and instead subclinical trauma-related symptoms.    KELSIE-II:  Total Score on 7/31/24 = 20.71, indicating low levels of dissociative experiences.     ASSESSMENT: Current Emotional / Mental Status (status of significant symptoms):   Risk status (Self / Other harm or suicidal ideation)   Patient denies current fears or concerns for personal safety.   Patient denies current or recent suicidal ideation or behaviors.   Patient denies current or recent homicidal  ideation or behaviors.   Patient denies current or recent self injurious behavior or ideation.   Patient denies other safety concerns.   Patient reports there has been no change in risk factors since her last session.     Patient reports there has been no change in protective factors since her last session.     Recommended that patient call 911 or go to the local ED should there be a change in any of these risk factors     Appearance:   Appropriate    Eye Contact:   Good    Psychomotor Behavior: Normal    Attitude:   Cooperative  Friendly Pleasant   Orientation:   All   Speech    Rate / Production: Talkative    Volume:  Normal    Mood:    Sad   Affect:    Tearful (when discussing childhood)   Thought Content:  Centered on dismissing her emotions   Thought Form:  Coherent  Logical    Insight:    Good      Medication Review:   No changes to current psychiatric medication(s)     Medication Compliance:   Yes     Changes in Health Issues:   None reported     Chemical Use Review:   Substance Use: Chemical use reviewed, no active concerns identified      Tobacco Use: No current tobacco use.      Diagnosis:  Major Depressive Disorder, Single Episode, Moderate   Attention-Deficit/Hyperactivity Disorder, Combined presentation  Generalized Anxiety Disorder  Other Specified Trauma- and Stressor-Related Disorder    Collateral Reports Completed:  Telehealth Consent      PLAN: (Patient Tasks / Therapist Tasks / Other)  Patient will continue focusing on behavioral activation to improve her mood. She will continue using mind mapping to identify and label any distress she experiences somatically. She will continue journaling to explore suppressed emotions, while practicing cognitive flexibility. Follow-up appointment scheduled on 2/13/25.    Sandra Bradley PsyD, Postdoctoral Fellow    Clinical supervision and documentation review provided by Leonor Henry, PhD LP   "1/30/2025    ______________________________________________________________________    Individual Treatment Plan    Patient's Name: Marya Mtz  \"Isidro\"  YOB: 1995    Date of Creation: 11/22/24    DSM5 Diagnoses: Major Depressive Disorder, Single Episode, Moderate, Attention-Deficit/Hyperactivity Disorder, Combined presentation, Generalized Anxiety Disorder, and Other Specified Trauma- and Stressor-Related Disorder  Psychosocial / Contextual Factors: History of MH Difficulties, History of ADHD, Served in  ([id number]) Non-Combat (4 State Activations, 1 Overseas Deployment in [facility]), Loss/Grief (3 Unit Members Passed Away), Contextual Issues, and System of Oppression (Experienced Racial Microaggressions).    Referral / Collaboration:  Referral to another professional/service is not indicated at this time.    Anticipated number of session for this episode of care: ~ 20-26 sessions  Anticipation frequency of session: Bi-Weekly  Anticipated Duration of each session: 38-52 minutes  Treatment plan will be reviewed in 90 days or when goals have been changed.     MeasurableTreatment Goal(s) related to diagnosis / functional impairment(s)  Goal 1: Patient will improve mood and lower depression levels, as measured by the PHQ-9.    I will know I've met my goal when I get back into my solo hobbies and reaching out to friends and loved ones more.\"    Objective #A (Patient Action)   Patient will increase interest, engagement, and pleasure in doing things.  Patient will decrease frequency and intensity of feeling down, depressed, hopeless.        Patient will Identify negative self-talk and behaviors: challenge core beliefs, myths, and actions.  Status: New - Date: 11/22/2024     Intervention(s)  Therapist will teach the CBT four-factor model, including analyzing automatic thoughts, uncovering core beliefs, disputing irrational thoughts, discussing functional analyses, increasing " "acceptance, and engaging in problem solving.  Therapist will provide psychoeducation on the 4P Model (predisposing, precipitating, perpetuating, and protective factors).  Therapist will assign homework for BA and help patient identify values that align/do not align with current daily activities.  Therapist will teach the principles of MBSR and assign patient mindfulness exercises.    Goal 2: Patient will work towards developing effective strategies for managing ADHD symptoms, enhancing focus, improving organizational and time-management skills, and building coping mechanisms to reduce hyperactivity/impulsivity.    I will know I've met my goal when I give myself elvis for breaking down tasks and developing more time awareness.      Objective #A (Patient Action).           Patient will identify and use 4 strategies to improve organization.  Patient will implement 4 procrastination strategies.  Patient will identify and compliment positives at least 2 times daily to support positive self-image.  Patient will identify 2 ways that ADHD causes difficulties interpersonally and at work.  Status: New - Date: 11/22/2024     Intervention(s)  Therapist will use action-oriented therapies, such as Behavioral Therapy, CBT, and DBT to help manage attention and hyperactivity symptoms.  Therapist will use self-instruction therapy to help patient learn how to \"talk herself through\" a task or activity (e.g., Stop Think Do).  Therapist will provide additional materials on ADHD resources (books, podcasts, websites).    Goal 3: Patient will learn coping skills and lower anxiety levels, as measured by the ISABEL-7.    I will know I've met my goal when I can identify the anxiety, manage it, and then move on.      Objective #A (Patient Action)  Patient will identify negative statements that she makes to herself.  Patient will identify the connection between anxiety and self-talk.  Patient will disrupt dysfunctional thoughts by increasing " "awareness for internal self-talk through relaxation, exercise, or other positive activities.  Patient will use thought-stopping strategy daily to reduce intrusive thoughts.  Status: New - Date: 11/22/2024     Intervention(s)  Therapist will provide psychoeducation on CBT and skills for cognitive restructuring.    Goal 4: Patient will focus on healing from her adverse and negative experiences, developing the ability to live fully in the present, and creating a positive future with healthy, supportive relationships.    I will know I've met my goal when I have better associations with these memories and not let my past experiences dictate my life.\"    Objective #A (Patient Action).           Patient will learn how safety and security in her body looks and in relationships.  Patient will use grounding techniques.  Patient will learn about trauma-related treatment (education of PTSD, protocol of EMDR, AIP model, 8 phases)  Patient will identify past memory, present triggers, and future responses following the AIP model.  Patient will participate in sessions of prolonged exposure or EMDR.  Patient will identify three distraction and diversion activities and use those activities to decrease level of anxiety from flashbacks.  Status: New - Date: 11/22/2024     Intervention(s)  Therapist will use a Trauma-Informed Care (Trauma-Focused ACT, EMDR, PE-PC, and IFS-Informed) approach by creating safety, empowering patient by educating her on options and giving her an active role in care, and transparency about treatment interventions.    Therapist will provide EMDR, psychoeducation, behavioral activation, and cognitive restructuring.  Therapist will provide education materials on childhood and adult attachment styles.  Therapist will teach patient about her own Internal Family System (IFS), including managers, firefighters, and exiles to better break through defenses and develop an integrated sense of self.  Therapist will " provide education on Emotion Body Mapping.     Patient has reviewed and agreed to the above plan.    Sandra Bradley PsyD, Postdoctoral Fellow November 22, 2024

## 2025-02-13 ENCOUNTER — VIRTUAL VISIT (OUTPATIENT)
Dept: PSYCHOLOGY | Facility: CLINIC | Age: 30
End: 2025-02-13
Payer: COMMERCIAL

## 2025-02-13 DIAGNOSIS — F41.1 GAD (GENERALIZED ANXIETY DISORDER): ICD-10-CM

## 2025-02-13 DIAGNOSIS — F90.2 ADHD (ATTENTION DEFICIT HYPERACTIVITY DISORDER), COMBINED TYPE: ICD-10-CM

## 2025-02-13 DIAGNOSIS — F32.1 MODERATE MAJOR DEPRESSION (H): Primary | ICD-10-CM

## 2025-02-13 DIAGNOSIS — F43.89 STRESS REACTION, CHRONIC: ICD-10-CM

## 2025-02-13 ASSESSMENT — ANXIETY QUESTIONNAIRES
5. BEING SO RESTLESS THAT IT IS HARD TO SIT STILL: MORE THAN HALF THE DAYS
GAD7 TOTAL SCORE: 4
7. FEELING AFRAID AS IF SOMETHING AWFUL MIGHT HAPPEN: NOT AT ALL
GAD7 TOTAL SCORE: 4
GAD7 TOTAL SCORE: 4
4. TROUBLE RELAXING: SEVERAL DAYS
1. FEELING NERVOUS, ANXIOUS, OR ON EDGE: NOT AT ALL
3. WORRYING TOO MUCH ABOUT DIFFERENT THINGS: SEVERAL DAYS
2. NOT BEING ABLE TO STOP OR CONTROL WORRYING: NOT AT ALL
7. FEELING AFRAID AS IF SOMETHING AWFUL MIGHT HAPPEN: NOT AT ALL
8. IF YOU CHECKED OFF ANY PROBLEMS, HOW DIFFICULT HAVE THESE MADE IT FOR YOU TO DO YOUR WORK, TAKE CARE OF THINGS AT HOME, OR GET ALONG WITH OTHER PEOPLE?: NOT DIFFICULT AT ALL
6. BECOMING EASILY ANNOYED OR IRRITABLE: NOT AT ALL

## 2025-02-13 ASSESSMENT — PATIENT HEALTH QUESTIONNAIRE - PHQ9
SUM OF ALL RESPONSES TO PHQ QUESTIONS 1-9: 6
10. IF YOU CHECKED OFF ANY PROBLEMS, HOW DIFFICULT HAVE THESE PROBLEMS MADE IT FOR YOU TO DO YOUR WORK, TAKE CARE OF THINGS AT HOME, OR GET ALONG WITH OTHER PEOPLE: NOT DIFFICULT AT ALL
SUM OF ALL RESPONSES TO PHQ QUESTIONS 1-9: 6

## 2025-02-13 NOTE — PROGRESS NOTES
"[facility]   Mental Health & Addiction Services     Progress Note    Patient Name: Marya Mtz   \"Isidro\"  Date: 2/13/25      Service Type:  Individual      Session Start Time: 12:01 PM  Session End Time: 12:44 PM     Session Length: 43 minutes    Session #: 7    Attendees: Client attended alone     Service Modality:  Video Visit:      Provider verified identity through the following two step process.  Patient provided:  Patient is known previously to provider    Telemedicine Visit: The patient's condition can be safely assessed and treated via synchronous audio and visual telemedicine encounter.      Reason for Telemedicine Visit: Patient convenience (e.g. access to timely appointments / distance to available provider)    Originating Site (Patient Location): Patient's home     Distant Site (Provider Location): Provider Remote Setting- Home Office    Consent:  The patient/guardian has verbally consented to: the potential risks and benefits of telemedicine (video visit) versus in person care; bill my insurance or make self-payment for services provided; and responsibility for payment of non-covered services.     Patient would like the video invitation sent by:  Send to e-mail at: [email or web address]    Mode of Communication:  Video Conference via [employer]    Distant Location (Provider):  Off-site    As the provider I attest to compliance with applicable laws and regulations related to telemedicine.    DATA  Crisis: No  Extended Session (53+ minutes): No  Interactive Complexity: No      Progress Since Last Session (Related to Symptoms / Goals / Homework):              Symptoms: Improving - Patient reported ongoing improvement in her mood since the last session on 1/30/25.    Homework: Completed - Patient engaged in behavioral activation to support mood improvement. She continued journaling to explore suppressed emotions, while practicing cognitive flexibility.                 Episode of Care Goals: " "Satisfactory progress - ACTION (Actively working towards change); Intervened by reinforcing change plan / affirming steps taken                Current / Ongoing Stressors and Concerns:  History of MH Difficulties, History of ADHD, Served in  ([id number]) Non-Combat (4 State Activations, 1 Overseas Deployment in [facility]), Loss/Grief (3 Unit Members Passed Away), Contextual Issues, and System of Oppression (Experienced Racial Microaggressions).                 Treatment Objective(s) Addressed in This Session:          Patient will increase interest, engagement, and pleasure in doing things.  Patient will learn how safety and security in her body looks.  Patient will use grounding techniques.  Patient will identify the connection between anxiety and self-talk.  Patient will disrupt dysfunctional thoughts by increasing awareness for internal self-talk through relaxation, exercise, or other positive activities.                Intervention:  Session focused on reviewing the patient's homework, particularly journaling to express suppressed emotions. She noted a pattern of  catering to other people s emotions , highlighting ongoing emotional avoidance. We explored difficulties with  saying no,  which was challenging in childhood and continues to be difficult, reinforcing the need to practice DEAR MAN skills. Patient expressed feeling  robbed at times  of personal experiences and autonomy (\"not allowed to bring up my emotions\"), contributing to ongoing struggles with over-apologizing. Interventions focused on boundary-setting, self-validation, and reducing excessive apologizing to promote assertive communication in settings at work and with friends.    Assessments completed prior to visit:  PHQ9:       11/1/2024    12:01 PM 11/22/2024     9:26 AM 12/9/2024    10:45 AM 1/2/2025    12:46 PM 1/16/2025    11:54 AM 1/30/2025    10:45 AM 2/13/2025    11:47 AM   PHQ-9 SCORE   PHQ-9 Total Score MyChart 7 " (Mild depression) 5 (Mild depression) 3 (Minimal depression) 6 (Mild depression) 5 (Mild depression) 5 (Mild depression) 6 (Mild depression)   PHQ-9 Total Score 7  5  3  6  5  5  6       GAD7:       11/1/2024    12:03 PM 11/22/2024     9:29 AM 12/9/2024    10:46 AM 1/2/2025    12:47 PM 1/16/2025    11:55 AM 1/30/2025    10:46 AM 2/13/2025    11:48 AM   ISABEL-7 SCORE   Total Score 0 (minimal anxiety) 2 (minimal anxiety) 2 (minimal anxiety) 2 (minimal anxiety) 1 (minimal anxiety) 2 (minimal anxiety) 4 (minimal anxiety)   Total Score 0  2  2  2  1  2  4       TSQ:  Upsetting thoughts or memories about the event that have come into your mind against your will: No  Upsetting dreams about the event: No  Acting or feeling as though the event were happening again: No  Feeling upset by reminders of the event: No  Bodily reactions (such as fast heartbeat, stomach churning, sweatiness, dizziness) when reminded of the event: No  Difficulty falling or staying asleep: No  Irritability or outbursts of anger: No  Difficulty concentrating: Yes  Heightened awareness of potential dangers to yourself and others: Yes  Being jumpy or being startled at something unexpected: Yes  *Total Score on 11/22/24 = 3, indicating a low-risk of having PTSD according to the DSM and instead subclinical trauma-related symptoms.    KELSIE-II:  Total Score on 7/31/24 = 20.71, indicating low levels of dissociative experiences.     ASSESSMENT: Current Emotional / Mental Status (status of significant symptoms):   Risk status (Self / Other harm or suicidal ideation)   Patient denies current fears or concerns for personal safety.   Patient denies current or recent suicidal ideation or behaviors.   Patient denies current or recent homicidal ideation or behaviors.   Patient denies current or recent self injurious behavior or ideation.   Patient denies other safety concerns.   Patient reports there has been no change in risk factors since her last session.     Patient  "reports there has been no change in protective factors since her last session.     Recommended that patient call 911 or go to the local ED should there be a change in any of these risk factors     Appearance:   Appropriate    Eye Contact:   Good    Psychomotor Behavior: Normal    Attitude:   Cooperative  Friendly Pleasant   Orientation:   All   Speech    Rate / Production: Talkative    Volume:  Normal    Mood:    Slightly sad   Affect:    Appropriate   Thought Content:  Centered on difficulty saying \"no\"   Thought Form:  Coherent  Logical    Insight:    Good      Medication Review:   No changes to current psychiatric medication(s)     Medication Compliance:   Yes     Changes in Health Issues:   None reported     Chemical Use Review:   Substance Use: Chemical use reviewed, no active concerns identified      Tobacco Use: No current tobacco use.      Diagnosis:  Major Depressive Disorder, Single Episode, Moderate   Attention-Deficit/Hyperactivity Disorder, Combined presentation  Generalized Anxiety Disorder  Other Specified Trauma- and Stressor-Related Disorder    Collateral Reports Completed:  Telehealth Consent      PLAN: (Patient Tasks / Therapist Tasks / Other)  Patient will continue focusing on behavioral activation to improve her mood. She will continue using mind mapping to identify and label any distress she experiences somatically, while also journaling to explore suppressed emotions. She will also practice DEAR MAN skills in daily interactions, set at least one boundary at work or with friends, and track instances of over-apologizing to increase awareness and reinforce self-validation. Follow-up appointment scheduled on 2/26/25.Treatment plan will be updated at next appointment.    Sandra Bradley PsyD, Postdoctoral Fellow    Clinical supervision and documentation review provided by Leonor Henry, PhD DIONNE  2/13/2025    ______________________________________________________________________    Individual Treatment " "Plan    Patient's Name: Marya Mtz  \"Isidro\"  YOB: 1995    Date of Creation: 11/22/24    DSM5 Diagnoses: Major Depressive Disorder, Single Episode, Moderate, Attention-Deficit/Hyperactivity Disorder, Combined presentation, Generalized Anxiety Disorder, and Other Specified Trauma- and Stressor-Related Disorder  Psychosocial / Contextual Factors: History of MH Difficulties, History of ADHD, Served in  ([id number]) Non-Combat (4 State Activations, 1 Overseas Deployment in [facility]), Loss/Grief (3 Unit Members Passed Away), Contextual Issues, and System of Oppression (Experienced Racial Microaggressions).    Referral / Collaboration:  Referral to another professional/service is not indicated at this time.    Anticipated number of session for this episode of care: ~ 20-26 sessions  Anticipation frequency of session: Bi-Weekly  Anticipated Duration of each session: 38-52 minutes  Treatment plan will be reviewed in 90 days or when goals have been changed.     MeasurableTreatment Goal(s) related to diagnosis / functional impairment(s)  Goal 1: Patient will improve mood and lower depression levels, as measured by the PHQ-9.    I will know I've met my goal when I get back into my solo hobbies and reaching out to friends and loved ones more.\"    Objective #A (Patient Action)   Patient will increase interest, engagement, and pleasure in doing things.  Patient will decrease frequency and intensity of feeling down, depressed, hopeless.        Patient will Identify negative self-talk and behaviors: challenge core beliefs, myths, and actions.  Status: New - Date: 11/22/2024     Intervention(s)  Therapist will teach the CBT four-factor model, including analyzing automatic thoughts, uncovering core beliefs, disputing irrational thoughts, discussing functional analyses, increasing acceptance, and engaging in problem solving.  Therapist will provide psychoeducation on the 4P Model (predisposing, " "precipitating, perpetuating, and protective factors).  Therapist will assign homework for BA and help patient identify values that align/do not align with current daily activities.  Therapist will teach the principles of MBSR and assign patient mindfulness exercises.    Goal 2: Patient will work towards developing effective strategies for managing ADHD symptoms, enhancing focus, improving organizational and time-management skills, and building coping mechanisms to reduce hyperactivity/impulsivity.    I will know I've met my goal when I give myself elvis for breaking down tasks and developing more time awareness.      Objective #A (Patient Action).           Patient will identify and use 4 strategies to improve organization.  Patient will implement 4 procrastination strategies.  Patient will identify and compliment positives at least 2 times daily to support positive self-image.  Patient will identify 2 ways that ADHD causes difficulties interpersonally and at work.  Status: New - Date: 11/22/2024     Intervention(s)  Therapist will use action-oriented therapies, such as Behavioral Therapy, CBT, and DBT to help manage attention and hyperactivity symptoms.  Therapist will use self-instruction therapy to help patient learn how to \"talk herself through\" a task or activity (e.g., Stop Think Do).  Therapist will provide additional materials on ADHD resources (books, podcasts, websites).    Goal 3: Patient will learn coping skills and lower anxiety levels, as measured by the ISABEL-7.    I will know I've met my goal when I can identify the anxiety, manage it, and then move on.      Objective #A (Patient Action)  Patient will identify negative statements that she makes to herself.  Patient will identify the connection between anxiety and self-talk.  Patient will disrupt dysfunctional thoughts by increasing awareness for internal self-talk through relaxation, exercise, or other positive activities.  Patient will use " "thought-stopping strategy daily to reduce intrusive thoughts.  Status: New - Date: 11/22/2024     Intervention(s)  Therapist will provide psychoeducation on CBT and skills for cognitive restructuring.    Goal 4: Patient will focus on healing from her adverse and negative experiences, developing the ability to live fully in the present, and creating a positive future with healthy, supportive relationships.    I will know I've met my goal when I have better associations with these memories and not let my past experiences dictate my life.\"    Objective #A (Patient Action).           Patient will learn how safety and security in her body looks and in relationships.  Patient will use grounding techniques.  Patient will learn about trauma-related treatment (education of PTSD, protocol of EMDR, AIP model, 8 phases)  Patient will identify past memory, present triggers, and future responses following the AIP model.  Patient will participate in sessions of prolonged exposure or EMDR.  Patient will identify three distraction and diversion activities and use those activities to decrease level of anxiety from flashbacks.  Status: New - Date: 11/22/2024     Intervention(s)  Therapist will use a Trauma-Informed Care (Trauma-Focused ACT, EMDR, PE-PC, and IFS-Informed) approach by creating safety, empowering patient by educating her on options and giving her an active role in care, and transparency about treatment interventions.    Therapist will provide EMDR, psychoeducation, behavioral activation, and cognitive restructuring.  Therapist will provide education materials on childhood and adult attachment styles.  Therapist will teach patient about her own Internal Family System (IFS), including managers, firefighters, and exiles to better break through defenses and develop an integrated sense of self.  Therapist will provide education on Emotion Body Mapping.     Patient has reviewed and agreed to the above plan.    Sandra Bradley PsyD, " Postdoctoral Fellow November 22, 2024

## 2025-02-26 ENCOUNTER — VIRTUAL VISIT (OUTPATIENT)
Dept: PSYCHOLOGY | Facility: CLINIC | Age: 30
End: 2025-02-26
Payer: COMMERCIAL

## 2025-02-26 DIAGNOSIS — F90.2 ADHD (ATTENTION DEFICIT HYPERACTIVITY DISORDER), COMBINED TYPE: ICD-10-CM

## 2025-02-26 DIAGNOSIS — F32.1 MODERATE MAJOR DEPRESSION (H): Primary | ICD-10-CM

## 2025-02-26 DIAGNOSIS — F43.89 STRESS REACTION, CHRONIC: ICD-10-CM

## 2025-02-26 DIAGNOSIS — F41.1 GAD (GENERALIZED ANXIETY DISORDER): ICD-10-CM

## 2025-02-26 PROCEDURE — 90834 PSYTX W PT 45 MINUTES: CPT | Mod: 95

## 2025-02-26 ASSESSMENT — ANXIETY QUESTIONNAIRES
2. NOT BEING ABLE TO STOP OR CONTROL WORRYING: NOT AT ALL
GAD7 TOTAL SCORE: 2
5. BEING SO RESTLESS THAT IT IS HARD TO SIT STILL: SEVERAL DAYS
1. FEELING NERVOUS, ANXIOUS, OR ON EDGE: NOT AT ALL
GAD7 TOTAL SCORE: 2
7. FEELING AFRAID AS IF SOMETHING AWFUL MIGHT HAPPEN: NOT AT ALL
IF YOU CHECKED OFF ANY PROBLEMS ON THIS QUESTIONNAIRE, HOW DIFFICULT HAVE THESE PROBLEMS MADE IT FOR YOU TO DO YOUR WORK, TAKE CARE OF THINGS AT HOME, OR GET ALONG WITH OTHER PEOPLE: NOT DIFFICULT AT ALL
GAD7 TOTAL SCORE: 2
3. WORRYING TOO MUCH ABOUT DIFFERENT THINGS: SEVERAL DAYS
4. TROUBLE RELAXING: NOT AT ALL
8. IF YOU CHECKED OFF ANY PROBLEMS, HOW DIFFICULT HAVE THESE MADE IT FOR YOU TO DO YOUR WORK, TAKE CARE OF THINGS AT HOME, OR GET ALONG WITH OTHER PEOPLE?: NOT DIFFICULT AT ALL
7. FEELING AFRAID AS IF SOMETHING AWFUL MIGHT HAPPEN: NOT AT ALL
6. BECOMING EASILY ANNOYED OR IRRITABLE: NOT AT ALL

## 2025-02-26 NOTE — PROGRESS NOTES
"[facility]   Mental Health & Addiction Services     Progress Note    Patient Name: Marya Mtz   \"Isidro\"  Date: 2/26/25      Service Type:  Individual      Session Start Time: 12:00 PM  Session End Time: 12:46 PM     Session Length: 46 minutes    Session #: 8    Attendees: Client attended alone     Service Modality:  Video Visit:      Provider verified identity through the following two step process.  Patient provided:  Patient is known previously to provider    Telemedicine Visit: The patient's condition can be safely assessed and treated via synchronous audio and visual telemedicine encounter.      Reason for Telemedicine Visit: Patient convenience (e.g. access to timely appointments / distance to available provider)    Originating Site (Patient Location): Patient's home     Distant Site (Provider Location): Provider Remote Setting- Home Office    Consent:  The patient/guardian has verbally consented to: the potential risks and benefits of telemedicine (video visit) versus in person care; bill my insurance or make self-payment for services provided; and responsibility for payment of non-covered services.     Patient would like the video invitation sent by:  Send to e-mail at: [email or web address]    Mode of Communication:  Video Conference via [employer]    Distant Location (Provider):  Off-site    As the provider I attest to compliance with applicable laws and regulations related to telemedicine.    DATA  Crisis: No  Extended Session (53+ minutes): No  Interactive Complexity: No      Progress Since Last Session (Related to Symptoms / Goals / Homework):              Symptoms: Maintaining - Patient reported that her mood and symptoms have remained stable since the last session on 2/13/25.    Homework: Completed - Patient engaged in behavioral activation to support mood improvement. She continued journaling to explore suppressed emotions, while practicing cognitive flexibility. She also practiced " "DEAR MAN skills in daily interactions, completed one boundary at work or with a friend, and tracked instances of over-apologizing to increase awareness and reinforce self-validation.                Episode of Care Goals: Satisfactory progress - ACTION (Actively working towards change); Intervened by reinforcing change plan / affirming steps taken                Current / Ongoing Stressors and Concerns:  History of MH Difficulties, History of ADHD, Served in  ([id number]) Non-Combat (4 State Activations, 1 Overseas Deployment in [facility]), Loss/Grief (3 Unit Members Passed Away), Contextual Issues, and System of Oppression (Experienced Racial Microaggressions).                 Treatment Objective(s) Addressed in This Session:          Patient will learn how safety and security in her body looks.  Patient will use grounding techniques.  Patient will identify the connection between anxiety and self-talk.  Patient will disrupt dysfunctional thoughts by increasing awareness for internal self-talk through relaxation, exercise, or other positive activities.  Patient will identify past adverse experiences and traumatic memory.                Intervention:  Session focused on reviewing the patient's completed homework and processing her experience with setting boundaries. We explored her tendency to justify saying \"no\" to plans and reframed prioritizing basic needs as an act of self-care rather than selfishness. Patient shared journaling entries that she later processed with her older sister, stating she \"dug deep.\" Safety and stabilization were provided as she disclosed memories of self-harming in middle school by cutting, recalling that when she informed her mother, she was told to \"pray about it.\" Using body mapping, we processed her present-moment responses (\"chest is tight, legs are numb, wow my watch just told me my heart rate is over 100\"). With the patient's permission, we engaged in " grounding and breathing exercises to regulate her physiological response. Ended session by collaboratively updating her treatment plan.    Assessments completed prior to visit:  PHQ9:       11/22/2024     9:26 AM 12/9/2024    10:45 AM 1/2/2025    12:46 PM 1/16/2025    11:54 AM 1/30/2025    10:45 AM 2/13/2025    11:47 AM 2/26/2025    11:48 AM   PHQ-9 SCORE   PHQ-9 Total Score MyChart 5 (Mild depression) 3 (Minimal depression) 6 (Mild depression) 5 (Mild depression) 5 (Mild depression) 6 (Mild depression) 6 (Mild depression)   PHQ-9 Total Score 5  3  6  5  5  6  6       GAD7:       11/22/2024     9:29 AM 12/9/2024    10:46 AM 1/2/2025    12:47 PM 1/16/2025    11:55 AM 1/30/2025    10:46 AM 2/13/2025    11:48 AM 2/26/2025    11:49 AM   ISABEL-7 SCORE   Total Score 2 (minimal anxiety) 2 (minimal anxiety) 2 (minimal anxiety) 1 (minimal anxiety) 2 (minimal anxiety) 4 (minimal anxiety) 2 (minimal anxiety)   Total Score 2  2  2  1  2  4  2       TSQ:  Upsetting thoughts or memories about the event that have come into your mind against your will: No  Upsetting dreams about the event: No  Acting or feeling as though the event were happening again: No  Feeling upset by reminders of the event: No  Bodily reactions (such as fast heartbeat, stomach churning, sweatiness, dizziness) when reminded of the event: No  Difficulty falling or staying asleep: No  Irritability or outbursts of anger: No  Difficulty concentrating: Yes  Heightened awareness of potential dangers to yourself and others: Yes  Being jumpy or being startled at something unexpected: Yes  *Total Score on 11/22/24 = 3, indicating a low-risk of having PTSD according to the DSM and instead subclinical trauma-related symptoms.    KELSIE-II:  Total Score on 7/31/24 = 20.71, indicating low levels of dissociative experiences.     ASSESSMENT: Current Emotional / Mental Status (status of significant symptoms):   Risk status (Self / Other harm or suicidal ideation)   Patient denies  current fears or concerns for personal safety.   Patient denies current or recent suicidal ideation or behaviors.   Patient denies current or recent homicidal ideation or behaviors.   Patient denies current or recent self injurious behavior or ideation.   Patient denies other safety concerns.   Patient reports there has been no change in risk factors since her last session.     Patient reports there has been no change in protective factors since her last session.     Recommended that patient call 911 or go to the local ED should there be a change in any of these risk factors     Appearance:   Appropriate    Eye Contact:   Good    Psychomotor Behavior: Normal    Attitude:   Cooperative  Friendly Pleasant   Orientation:   All   Speech    Rate / Production: Talkative    Volume:  Normal    Mood:    Slightly sad and anxious   Affect:    Tearful (when discussing her history of NSSI)   Thought Content:  Content focused on feelings of selfishness and prioritizing others' needs over her own   Thought Form:  Coherent  Logical    Insight:    Good        Medication Review:   No changes to current psychiatric medication(s)     Medication Compliance:   Yes     Changes in Health Issues:   None reported     Chemical Use Review:   Substance Use: Chemical use reviewed, no active concerns identified      Tobacco Use: No current tobacco use.      Diagnosis:  Major Depressive Disorder, Single Episode, Moderate   Attention-Deficit/Hyperactivity Disorder, Combined presentation  Generalized Anxiety Disorder  Other Specified Trauma- and Stressor-Related Disorder    Collateral Reports Completed:  Telehealth Consent  Treatment Plan Reviewed      PLAN: (Patient Tasks / Therapist Tasks / Other)  Patient will continue focusing on behavioral activation to improve her mood and practice DEAR MAN skills in daily interactions. She will also practice identifying present-moment physical sensations and engage in grounding and breathing exercises to  "regulate her physiological response. Follow-up appointment scheduled on 3/12/25.    Sandra Bradley PsyD, Postdoctoral Fellow    Clinical supervision and documentation review provided by Leonor Henry, PhD LP  2/26/2025    ______________________________________________________________________    Individual Treatment Plan    Patient's Name: Marya Mtz  \"Isidro\"  YOB: 1995    Date of Creation: 11/22/24  Date Treatment Plan Last Reviewed/Revised: 2/26/25    DSM5 Diagnoses: Major Depressive Disorder, Single Episode, Moderate, Attention-Deficit/Hyperactivity Disorder, Combined presentation, Generalized Anxiety Disorder, and Other Specified Trauma- and Stressor-Related Disorder  Psychosocial / Contextual Factors: History of MH Difficulties, History of ADHD, Served in  ([id number]) Non-Combat (4 State Activations, 1 Overseas Deployment in [facility]), Loss/Grief (3 Unit Members Passed Away), Contextual Issues, and System of Oppression (Experienced Racial Microaggressions).  PROMIS (reviewed every 90 days):   Global Mental Health Score: 12  Global Physical Health Score: 19   PROMIS TOTAL - SUBSCORES: 31      Referral / Collaboration:  Referral to another professional/service is not indicated at this time.    Anticipated number of session for this episode of care: ~ 20-26 sessions  Anticipation frequency of session: Bi-Weekly  Anticipated Duration of each session: 38-52 minutes  Treatment plan will be reviewed in 90 days or when goals have been changed.     MeasurableTreatment Goal(s) related to diagnosis / functional impairment(s)  Goal 1: Patient will improve mood and lower depression levels, as measured by the PHQ-9.    I will know I've met my goal when I get back into my solo hobbies and reaching out to friends and loved ones more.\"    Objective #A (Patient Action)   Patient will increase interest, engagement, and pleasure in doing things.  Patient will decrease frequency and intensity of " "feeling down, depressed, hopeless.        Patient will Identify negative self-talk and behaviors: challenge core beliefs, myths, and actions.  Status: Maintenance - Date: 2/26/25    Intervention(s)  Therapist will teach the CBT four-factor model, including analyzing automatic thoughts, uncovering core beliefs, disputing irrational thoughts, discussing functional analyses, increasing acceptance, and engaging in problem solving.  Therapist will provide psychoeducation on the 4P Model (predisposing, precipitating, perpetuating, and protective factors).  Therapist will assign homework for BA and help patient identify values that align/do not align with current daily activities.  Therapist will teach the principles of MBSR and assign patient mindfulness exercises.    Goal 2: Patient will work towards developing effective strategies for managing ADHD symptoms, enhancing focus, improving organizational and time-management skills, and building coping mechanisms to reduce hyperactivity/impulsivity.    I will know I've met my goal when I give myself elvis for breaking down tasks and developing more time awareness.      Objective #A (Patient Action).           Patient will identify and use 4 strategies to improve organization.  Patient will implement 4 procrastination strategies.  Patient will identify and compliment positives at least 2 times daily to support positive self-image.  Patient will identify 2 ways that ADHD causes difficulties interpersonally and at work.  Status: Continued - Date: 2/26/25    Intervention(s)  Therapist will use action-oriented therapies, such as Behavioral Therapy, CBT, and DBT to help manage attention and hyperactivity symptoms.  Therapist will use self-instruction therapy to help patient learn how to \"talk herself through\" a task or activity (e.g., Stop Think Do).  Therapist will provide additional materials on ADHD resources (books, podcasts, websites).    Goal 3: Patient will learn coping " "skills and lower anxiety levels, as measured by the ISABEL-7.    I will know I've met my goal when I can identify the anxiety, manage it, and then move on.      Objective #A (Patient Action)  Patient will identify negative statements that she makes to herself.  Patient will identify the connection between anxiety and self-talk.  Patient will disrupt dysfunctional thoughts by increasing awareness for internal self-talk through relaxation, exercise, or other positive activities.  Patient will use thought-stopping strategy daily to reduce intrusive thoughts.  Status: Maintenance - Date: 2/26/25     Intervention(s)  Therapist will provide psychoeducation on CBT and skills for cognitive restructuring.    Goal 4: Patient will focus on healing from her adverse and negative experiences, developing the ability to live fully in the present, and creating a positive future with healthy, supportive relationships.    I will know I've met my goal when I have better associations with these memories and not let my past experiences dictate my life.\"    Objective #A (Patient Action).           Patient will learn how safety and security in her body looks and in relationships.  Patient will use grounding techniques.  Patient will learn about trauma-related treatment (education of PTSD, protocol of EMDR, AIP model, 8 phases)  Patient will identify past memory, present triggers, and future responses following the AIP model.  Patient will participate in sessions of prolonged exposure or EMDR.  Patient will identify three distraction and diversion activities and use those activities to decrease level of anxiety from flashbacks.  Status: Continued - Date: 2/26/25    Intervention(s)  Therapist will use a Trauma-Informed Care (Trauma-Focused ACT, EMDR, PE-PC, and IFS-Informed) approach by creating safety, empowering patient by educating her on options and giving her an active role in care, and transparency about treatment interventions.  "   Therapist will provide EMDR, psychoeducation, behavioral activation, and cognitive restructuring.  Therapist will provide education materials on childhood and adult attachment styles.  Therapist will teach patient about her own Internal Family System (IFS), including managers, firefighters, and exiles to better break through defenses and develop an integrated sense of self.  Therapist will provide education on Emotion Body Mapping.     Patient has reviewed and agreed to the above plan.    Sandra Bradley PsyD, Postdoctoral Fellow February 26, 2024

## 2025-03-08 ENCOUNTER — HEALTH MAINTENANCE LETTER (OUTPATIENT)
Age: 30
End: 2025-03-08

## 2025-03-26 ENCOUNTER — VIRTUAL VISIT (OUTPATIENT)
Dept: PSYCHOLOGY | Facility: CLINIC | Age: 30
End: 2025-03-26
Payer: COMMERCIAL

## 2025-03-26 DIAGNOSIS — F41.1 GAD (GENERALIZED ANXIETY DISORDER): ICD-10-CM

## 2025-03-26 DIAGNOSIS — F43.89 STRESS REACTION, CHRONIC: ICD-10-CM

## 2025-03-26 DIAGNOSIS — F90.2 ADHD (ATTENTION DEFICIT HYPERACTIVITY DISORDER), COMBINED TYPE: ICD-10-CM

## 2025-03-26 DIAGNOSIS — F32.1 MODERATE MAJOR DEPRESSION (H): Primary | ICD-10-CM

## 2025-03-26 PROCEDURE — 90834 PSYTX W PT 45 MINUTES: CPT | Mod: 95

## 2025-03-26 ASSESSMENT — ANXIETY QUESTIONNAIRES
8. IF YOU CHECKED OFF ANY PROBLEMS, HOW DIFFICULT HAVE THESE MADE IT FOR YOU TO DO YOUR WORK, TAKE CARE OF THINGS AT HOME, OR GET ALONG WITH OTHER PEOPLE?: NOT DIFFICULT AT ALL
GAD7 TOTAL SCORE: 2
3. WORRYING TOO MUCH ABOUT DIFFERENT THINGS: SEVERAL DAYS
7. FEELING AFRAID AS IF SOMETHING AWFUL MIGHT HAPPEN: NOT AT ALL
6. BECOMING EASILY ANNOYED OR IRRITABLE: NOT AT ALL
GAD7 TOTAL SCORE: 2
GAD7 TOTAL SCORE: 2
1. FEELING NERVOUS, ANXIOUS, OR ON EDGE: NOT AT ALL
7. FEELING AFRAID AS IF SOMETHING AWFUL MIGHT HAPPEN: NOT AT ALL
4. TROUBLE RELAXING: NOT AT ALL
2. NOT BEING ABLE TO STOP OR CONTROL WORRYING: NOT AT ALL
IF YOU CHECKED OFF ANY PROBLEMS ON THIS QUESTIONNAIRE, HOW DIFFICULT HAVE THESE PROBLEMS MADE IT FOR YOU TO DO YOUR WORK, TAKE CARE OF THINGS AT HOME, OR GET ALONG WITH OTHER PEOPLE: NOT DIFFICULT AT ALL
5. BEING SO RESTLESS THAT IT IS HARD TO SIT STILL: SEVERAL DAYS

## 2025-03-26 NOTE — PROGRESS NOTES
"[facility]   Mental Health & Addiction Services     Progress Note    Patient Name: Marya Mtz   \"Isidro\"  Date: 3/26/25      Service Type:  Individual      Session Start Time: 12:00 AM  Session End Time: 12:45 AM     Session Length: 45 minutes    Session #: 9    Attendees: Client attended alone     Service Modality:  Video Visit:      Provider verified identity through the following two step process.  Patient provided:  Patient is known previously to provider    Telemedicine Visit: The patient's condition can be safely assessed and treated via synchronous audio and visual telemedicine encounter.      Reason for Telemedicine Visit: Patient convenience (e.g. 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     Patient would like the video invitation sent by:  Send to e-mail at: [email or web address]    Mode of Communication:  Video Conference via [employer]    Distant Location (Provider):  Off-site    As the provider I attest to compliance with applicable laws and regulations related to telemedicine.    DATA  Crisis: No  Extended Session (53+ minutes): No  Interactive Complexity: No      Progress Since Last Session (Related to Symptoms / Goals / Homework):              Symptoms: Maintaining - Patient reported that her mood and symptoms have remained stable since the last session on 2/26/25.    Homework: Completed - Patient engaged in behavioral activation to support mood improvement. She continued to practice identifying present-moment physical sensations and engage " in grounding and breathing exercises to regulate her physiological response.                 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), Loss/Grief (3 Unit Members Passed Away), Contextual Issues, and System of Oppression (Experienced Racial Microaggressions).                 Treatment Objective(s) Addressed in This Session:          Patient will learn how safety and security in her body looks.  Patient will use grounding techniques.  Patient will identify the connection between anxiety and self-talk.  Patient will disrupt dysfunctional thoughts by increasing awareness for internal self-talk through relaxation, exercise, or other positive activities.  Patient will identify past adverse experiences and traumatic memory.                Intervention:  Session focused on reviewing the patient s completed homework, which involved reflection on suppressed emotional expression during childhood. Insight-oriented techniques were used to process the connection between early emotional inhibition and the emergence of anger in adulthood. Patient identified that this anger has contributed to  a lot of negative thoughts,  and reported using gratitude journaling as a cognitive strategy to help balance her mood. Cognitive restructuring was positively reinforced, and clinician provided validation and supportive feedback to strengthen adaptive coping. Psychoeducation on emotional avoidance was provided, and an intersectional lens was used to explore the sociocultural and developmental factors contributing to continued difficulty with emotional expression. Emotion-body mapping was revisited as a somatic tool to  enhance emotional awareness and increase tolerance.    Assessments completed prior to visit:  PHQ9:       12/9/2024    10:45 AM 1/2/2025    12:46 PM 1/16/2025    11:54 AM 1/30/2025    10:45 AM 2/13/2025    11:47 AM 2/26/2025    11:48 AM 3/26/2025    12:01 PM   PHQ-9 SCORE   PHQ-9 Total Score MyChart 3 (Minimal depression) 6 (Mild depression) 5 (Mild depression) 5 (Mild depression) 6 (Mild depression) 6 (Mild depression) 7 (Mild depression)   PHQ-9 Total Score 3  6  5  5  6  6  7       GAD7:       12/9/2024    10:46 AM 1/2/2025    12:47 PM 1/16/2025    11:55 AM 1/30/2025    10:46 AM 2/13/2025    11:48 AM 2/26/2025    11:49 AM 3/26/2025    12:02 PM   ISABEL-7 SCORE   Total Score 2 (minimal anxiety) 2 (minimal anxiety) 1 (minimal anxiety) 2 (minimal anxiety) 4 (minimal anxiety) 2 (minimal anxiety) 2 (minimal anxiety)   Total Score 2  2  1  2  4  2  2        TSQ:  Upsetting thoughts or memories about the event that have come into your mind against your will: No  Upsetting dreams about the event: No  Acting or feeling as though the event were happening again: No  Feeling upset by reminders of the event: No  Bodily reactions (such as fast heartbeat, stomach churning, sweatiness, dizziness) when reminded of the event: No  Difficulty falling or staying asleep: No  Irritability or outbursts of anger: No  Difficulty concentrating: Yes  Heightened awareness of potential dangers to yourself and others: Yes  Being jumpy or being startled at something unexpected: Yes  *Total Score on 11/22/24 = 3, indicating a low-risk of having PTSD according to the DSM and instead subclinical trauma-related symptoms.    KELSIE-II:  Total Score on 7/31/24 = 20.71, indicating low levels of dissociative experiences.     ASSESSMENT: Current Emotional / Mental Status (status of significant symptoms):   Risk status (Self / Other harm or suicidal ideation)   Patient denies current fears or concerns for personal safety.   Patient denies current or recent  suicidal ideation or behaviors.   Patient denies current or recent homicidal ideation or behaviors.   Patient denies current or recent self injurious behavior or ideation.   Patient denies other safety concerns.   Patient reports there has been no change in risk factors since her last session.     Patient reports there has been no change in protective factors since her last session.     Recommended that patient call 911 or go to the local ED should there be a change in any of these risk factors     Appearance:   Appropriate    Eye Contact:   Good    Psychomotor Behavior: Normal    Attitude:   Cooperative  Friendly Pleasant   Orientation:   All   Speech    Rate / Production: Talkative    Volume:  Normal    Mood:    Slightly sad   Affect:    Appropriate   Thought Content:  Content focused on cultural and family dynamics   Thought Form:  Coherent  Logical    Insight:    Good       Medication Review:   No changes to current psychiatric medication(s)     Medication Compliance:   No - Patient reported running out of her Addrall XR 10 MG. She plans to contact her prescriber for a refill.     Changes in Health Issues:   None reported     Chemical Use Review:   Substance Use: Chemical use reviewed, no active concerns identified      Tobacco Use: No current tobacco use.      Diagnosis:  Major Depressive Disorder, Single Episode, Moderate   Attention-Deficit/Hyperactivity Disorder, Combined presentation  Generalized Anxiety Disorder  Other Specified Trauma- and Stressor-Related Disorder    Collateral Reports Completed:  Telehealth Consent      PLAN: (Patient Tasks / Therapist Tasks / Other)  Patient will continue focusing on behavioral activation to improve her mood and practice DBT interpersonal effectiveness skills in daily interactions. She will also practice identifying present-moment physical sensations and engage in grounding and breathing exercises to regulate her physiological response. Follow-up appointment scheduled  "on 4/23/25.    Sandra Bradley PsyD, Postdoctoral Fellow  Note was reviewed and clinical supervision provided by Endy Wilburn.D, LP  March 26, 2025        ______________________________________________________________________    Individual Treatment Plan    Patient's Name: Marya Mtz  \"Isidro\"  YOB: 1995    Date of Creation: 11/22/24  Date Treatment Plan Last Reviewed/Revised: 2/26/25    DSM5 Diagnoses: Major Depressive Disorder, Single Episode, Moderate, Attention-Deficit/Hyperactivity Disorder, Combined presentation, Generalized Anxiety Disorder, and Other Specified Trauma- and Stressor-Related Disorder  Psychosocial / Contextual Factors: History of MH Difficulties, History of ADHD, Served in  ([id number]) Non-Combat (4 State Activations, 1 [place]verseas Deployment in [facility]), Loss/Grief (3 Unit Members Passed Away), Contextual Issues, and System of Oppression (Experienced Racial Microaggressions).    Referral / Collaboration:  Referral to another professional/service is not indicated at this time.    Anticipated number of session for this episode of care: ~ 20-26 sessions  Anticipation frequency of session: Bi-Weekly  Anticipated Duration of each session: 38-52 minutes  Treatment plan will be reviewed in 90 days or when goals have been changed.     MeasurableTreatment Goal(s) related to diagnosis / functional impairment(s)  Goal 1: Patient will improve mood and lower depression levels, as measured by the PHQ-9.    I will know I've met my goal when I get back into my solo hobbies and reaching out to friends and loved ones more.\"    Objective #A (Patient Action)   Patient will increase interest, engagement, and pleasure in doing things.  Patient will decrease frequency and intensity of feeling down, depressed, hopeless.        Patient will Identify negative self-talk and behaviors: challenge core beliefs, myths, and actions.  Status: Maintenance - Date: " "2/26/25    Intervention(s)  Therapist will teach the CBT four-factor model, including analyzing automatic thoughts, uncovering core beliefs, disputing irrational thoughts, discussing functional analyses, increasing acceptance, and engaging in problem solving.  Therapist will provide psychoeducation on the 4P Model (predisposing, precipitating, perpetuating, and protective factors).  Therapist will assign homework for BA and help patient identify values that align/do not align with current daily activities.  Therapist will teach the principles of MBSR and assign patient mindfulness exercises.    Goal 2: Patient will work towards developing effective strategies for managing ADHD symptoms, enhancing focus, improving organizational and time-management skills, and building coping mechanisms to reduce hyperactivity/impulsivity.    I will know I've met my goal when I give myself elvis for breaking down tasks and developing more time awareness.      Objective #A (Patient Action).           Patient will identify and use 4 strategies to improve organization.  Patient will implement 4 procrastination strategies.  Patient will identify and compliment positives at least 2 times daily to support positive self-image.  Patient will identify 2 ways that ADHD causes difficulties interpersonally and at work.  Status: Continued - Date: 2/26/25    Intervention(s)  Therapist will use action-oriented therapies, such as Behavioral Therapy, CBT, and DBT to help manage attention and hyperactivity symptoms.  Therapist will use self-instruction therapy to help patient learn how to \"talk herself through\" a task or activity (e.g., Stop Think Do).  Therapist will provide additional materials on ADHD resources (books, podcasts, websites).    Goal 3: Patient will learn coping skills and lower anxiety levels, as measured by the ISABEL-7.    I will know I've met my goal when I can identify the anxiety, manage it, and then move on.      Objective #A " "(Patient Action)  Patient will identify negative statements that she makes to herself.  Patient will identify the connection between anxiety and self-talk.  Patient will disrupt dysfunctional thoughts by increasing awareness for internal self-talk through relaxation, exercise, or other positive activities.  Patient will use thought-stopping strategy daily to reduce intrusive thoughts.  Status: Maintenance - Date: 2/26/25     Intervention(s)  Therapist will provide psychoeducation on CBT and skills for cognitive restructuring.    Goal 4: Patient will focus on healing from her adverse and negative experiences, developing the ability to live fully in the present, and creating a positive future with healthy, supportive relationships.    I will know I've met my goal when I have better associations with these memories and not let my past experiences dictate my life.\"    Objective #A (Patient Action).           Patient will learn how safety and security in her body looks and in relationships.  Patient will use grounding techniques.  Patient will learn about trauma-related treatment (education of PTSD, protocol of EMDR, AIP model, 8 phases)  Patient will identify past memory, present triggers, and future responses following the AIP model.  Patient will participate in sessions of prolonged exposure or EMDR.  Patient will identify three distraction and diversion activities and use those activities to decrease level of anxiety from flashbacks.  Status: Continued - Date: 2/26/25    Intervention(s)  Therapist will use a Trauma-Informed Care (Trauma-Focused ACT, EMDR, PE-PC, and IFS-Informed) approach by creating safety, empowering patient by educating her on options and giving her an active role in care, and transparency about treatment interventions.    Therapist will provide EMDR, psychoeducation, behavioral activation, and cognitive restructuring.  Therapist will provide education materials on childhood and adult attachment " styles.  Therapist will teach patient about her own Internal Family System (IFS), including managers, firefighters, and exiles to better break through defenses and develop an integrated sense of self.  Therapist will provide education on Emotion Body Mapping.     Patient has reviewed and agreed to the above plan.    Sandra Bradley PsyD, Postdoctoral Fellow February 26, 2024  Note was reviewed and clinical supervision provided by Endy Wilburn.D, LP  March 26, 2025

## 2025-04-23 ENCOUNTER — VIRTUAL VISIT (OUTPATIENT)
Dept: PSYCHOLOGY | Facility: CLINIC | Age: 30
End: 2025-04-23
Payer: COMMERCIAL

## 2025-04-23 DIAGNOSIS — F32.1 MODERATE MAJOR DEPRESSION (H): Primary | ICD-10-CM

## 2025-04-23 DIAGNOSIS — F41.1 GAD (GENERALIZED ANXIETY DISORDER): ICD-10-CM

## 2025-04-23 DIAGNOSIS — F43.89 STRESS REACTION, CHRONIC: ICD-10-CM

## 2025-04-23 DIAGNOSIS — F90.2 ADHD (ATTENTION DEFICIT HYPERACTIVITY DISORDER), COMBINED TYPE: ICD-10-CM

## 2025-04-23 PROCEDURE — 90834 PSYTX W PT 45 MINUTES: CPT | Mod: 95

## 2025-04-23 ASSESSMENT — PATIENT HEALTH QUESTIONNAIRE - PHQ9
10. IF YOU CHECKED OFF ANY PROBLEMS, HOW DIFFICULT HAVE THESE PROBLEMS MADE IT FOR YOU TO DO YOUR WORK, TAKE CARE OF THINGS AT HOME, OR GET ALONG WITH OTHER PEOPLE: NOT DIFFICULT AT ALL
SUM OF ALL RESPONSES TO PHQ QUESTIONS 1-9: 5
SUM OF ALL RESPONSES TO PHQ QUESTIONS 1-9: 5

## 2025-04-23 ASSESSMENT — ANXIETY QUESTIONNAIRES
5. BEING SO RESTLESS THAT IT IS HARD TO SIT STILL: SEVERAL DAYS
8. IF YOU CHECKED OFF ANY PROBLEMS, HOW DIFFICULT HAVE THESE MADE IT FOR YOU TO DO YOUR WORK, TAKE CARE OF THINGS AT HOME, OR GET ALONG WITH OTHER PEOPLE?: NOT DIFFICULT AT ALL
1. FEELING NERVOUS, ANXIOUS, OR ON EDGE: SEVERAL DAYS
6. BECOMING EASILY ANNOYED OR IRRITABLE: NOT AT ALL
4. TROUBLE RELAXING: NOT AT ALL
IF YOU CHECKED OFF ANY PROBLEMS ON THIS QUESTIONNAIRE, HOW DIFFICULT HAVE THESE PROBLEMS MADE IT FOR YOU TO DO YOUR WORK, TAKE CARE OF THINGS AT HOME, OR GET ALONG WITH OTHER PEOPLE: NOT DIFFICULT AT ALL
2. NOT BEING ABLE TO STOP OR CONTROL WORRYING: NOT AT ALL
GAD7 TOTAL SCORE: 3
7. FEELING AFRAID AS IF SOMETHING AWFUL MIGHT HAPPEN: NOT AT ALL
3. WORRYING TOO MUCH ABOUT DIFFERENT THINGS: SEVERAL DAYS
GAD7 TOTAL SCORE: 3
GAD7 TOTAL SCORE: 3
7. FEELING AFRAID AS IF SOMETHING AWFUL MIGHT HAPPEN: NOT AT ALL

## 2025-04-23 NOTE — PROGRESS NOTES
"[facility]   Mental Health & Addiction Services     Progress Note    Patient Name: Marya Mtz   \"Isidro\"  Date: 4/23/25      Service Type:  Individual      Session Start Time: 12:00 PM  Session End Time: 12:40 PM     Session Length: 40 minutes    Session #: 10    Attendees: Client attended alone     Service Modality:  Video Visit:      Provider verified identity through the following two step process.  Patient provided:  Patient is known previously to provider    Telemedicine Visit: The patient's condition can be safely assessed and treated via synchronous audio and visual telemedicine encounter.      Reason for Telemedicine Visit: Patient convenience (e.g. 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     Patient would like the video invitation sent by:  Send to e-mail at: [email or web address]    Mode of Communication:  Video Conference via Jo[employer]    Distant Location (Provider):  Off-site    As the provider I attest to compliance with applicable laws and regulations related to telemedicine.    DATA  Crisis: No  Extended Session (53+ minutes): No  Interactive Complexity: No      Progress Since Last Session (Related to Symptoms / Goals / Homework):              Symptoms: Maintaining - Patient reported that her mood and symptoms have remained stable since the last session on 3/26/25.    Homework: Completed - Patient engaged in behavioral activation to maintain mood and practiced DBT interpersonal effectiveness skills with her mother.                 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                 Treatment Objective(s) Addressed in This Session:          Patient will identify the connection between anxiety and self-talk.  Patient will disrupt dysfunctional thoughts by increasing awareness for internal self-talk through relaxation, exercise, or other positive activities.  Patient will identify and use strategies to improve organization.  Patient will implement procrastination strategies.                Intervention:  Session focused on reviewing the patient s thoughts, feelings, behaviors, and physical sensations over the past few weeks. Patient reflected on a recent conversation with her mother in which she actively practiced interpersonal effectiveness skills. She described the interaction as meaningful, stating it  humanized my mom,  and shared that she has been working on accepting her mother as she is. Clinician provided validation and support while reinforcing the use of DBT strategies (radical acceptance, dialectical thinking, and the DEAR MAN interpersonal effectiveness skill). Clinician encouraged continued practice in future interactions. Session shifted to focus on ADHD-related symptoms, with targeted interventions to support executive functioning through lifestyle modifications. Clinician collaborated with the patient to identify and implement strategies such as meal prepping, increasing structure with medication routines, and incorporating regular exercise and nutrition planning.    Assessments  completed prior to visit:  PHQ9:       1/2/2025    12:46 PM 1/16/2025    11:54 AM 1/30/2025    10:45 AM 2/13/2025    11:47 AM 2/26/2025    11:48 AM 3/26/2025    12:01 PM 4/23/2025    11:47 AM   PHQ-9 SCORE   PHQ-9 Total Score MyChart 6 (Mild depression) 5 (Mild depression) 5 (Mild depression) 6 (Mild depression) 6 (Mild depression) 7 (Mild depression) 5 (Mild depression)   PHQ-9 Total Score 6  5  5  6  6  7  5       GAD7:       1/2/2025    12:47 PM 1/16/2025    11:55 AM 1/30/2025    10:46 AM 2/13/2025    11:48 AM 2/26/2025    11:49 AM 3/26/2025    12:02 PM 4/23/2025    11:48 AM   ISABEL-7 SCORE   Total Score 2 (minimal anxiety) 1 (minimal anxiety) 2 (minimal anxiety) 4 (minimal anxiety) 2 (minimal anxiety) 2 (minimal anxiety) 3 (minimal anxiety)   Total Score 2  1  2  4  2  2  3        TSQ:  Upsetting thoughts or memories about the event that have come into your mind against your will: No  Upsetting dreams about the event: No  Acting or feeling as though the event were happening again: No  Feeling upset by reminders of the event: No  Bodily reactions (such as fast heartbeat, stomach churning, sweatiness, dizziness) when reminded of the event: No  Difficulty falling or staying asleep: No  Irritability or outbursts of anger: No  Difficulty concentrating: Yes  Heightened awareness of potential dangers to yourself and others: Yes  Being jumpy or being startled at something unexpected: Yes  *Total Score on 11/22/24 = 3, indicating a low-risk of having PTSD according to the DSM and instead subclinical trauma-related symptoms.    KELSIE-II:  Total Score on 7/31/24 = 20.71, indicating low levels of dissociative experiences.     ASSESSMENT: Current Emotional / Mental Status (status of significant symptoms):   Risk status (Self / Other harm or suicidal ideation)   Patient denies current fears or concerns for personal safety.   Patient denies current or recent suicidal ideation or behaviors.   Patient denies current or recent  homicidal ideation or behaviors.   Patient denies current or recent self injurious behavior or ideation.   Patient denies other safety concerns.   Patient reports there has been no change in risk factors since her last session.     Patient reports there has been no change in protective factors since her last session.     Recommended that patient call 911 or go to the local ED should there be a change in any of these risk factors     Appearance:   Appropriate    Eye Contact:   Good    Psychomotor Behavior: Normal    Attitude:   Cooperative  Friendly Pleasant   Orientation:   All   Speech    Rate / Production: Talkative    Volume:  Normal    Mood:    Normal   Affect:    Appropriate   Thought Content:  Content focused on acceptance   Thought Form:  Coherent  Logical    Insight:    Good       Medication Review:   No changes to current psychiatric medication(s)     Medication Compliance:   Yes     Changes in Health Issues:   None reported     Chemical Use Review:   Substance Use: Chemical use reviewed, no active concerns identified      Tobacco Use: No current tobacco use.      Diagnosis:  Major Depressive Disorder, Single Episode, Moderate   Attention-Deficit/Hyperactivity Disorder, Combined presentation  Generalized Anxiety Disorder  Other Specified Trauma- and Stressor-Related Disorder    Collateral Reports Completed:  Telehealth Consent      PLAN: (Patient Tasks / Therapist Tasks / Other)  Patient will continue focusing on behavioral activation to maintain her mood and practice DBT interpersonal effectiveness skills in daily interactions. She will also use ADHD strategies and lifestyle modifications (meal prepping, structuring medication routines, and incorporating regular exercise and nutritional planning). Follow-up appointment scheduled on 5/8/25.    Sandra Bradley PsyD, Postdoctoral Fellow    Clinical supervision and documentation review provided by Leonor Henry, PhD LP   "4/23/2025  ______________________________________________________________________    Individual Treatment Plan    Patient's Name: Marya Mtz  \"Isidro\"  YOB: 1995    Date of Creation: 11/22/24  Date Treatment Plan Last Reviewed/Revised: 2/26/25    DSM5 Diagnoses: Major Depressive Disorder, Single Episode, Moderate, Attention-Deficit/Hyperactivity Disorder, Combined presentation, Generalized Anxiety Disorder, and Other Specified Trauma- and Stressor-Related Disorder  Psychosocial / Contextual Factors: History of MH Difficulties, History of ADHD, Served in  ([id number]) Non-Combat (4 State Activations, 1 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    Referral / Collaboration:  Referral to another professional/service is not indicated at this time.    Anticipated number of session for this episode of care: ~ 20-26 sessions  Anticipation frequency of session: Bi-Weekly  Anticipated Duration of each session: 38-52 minutes  Treatment plan will be reviewed in 90 days or when goals have been changed.     MeasurableTreatment Goal(s) related to diagnosis / functional impairment(s)  Goal 1: Patient will improve mood and lower depression levels, as measured by the PHQ-9.    I will know I've met my goal when I get back into my solo hobbies and reaching out to friends and loved ones more.\"    Objective #A (Patient Action)   Patient will increase interest, engagement, and pleasure in doing things.  Patient will decrease frequency and intensity of feeling down, depressed, hopeless.        Patient will Identify negative self-talk and behaviors: challenge core beliefs, myths, and actions.  Status: Maintenance - Date: 2/26/25    Intervention(s)  Therapist will teach the CBT four-factor model, including analyzing automatic thoughts, uncovering core beliefs, disputing irrational " "thoughts, discussing functional analyses, increasing acceptance, and engaging in problem solving.  Therapist will provide psychoeducation on the 4P Model (predisposing, precipitating, perpetuating, and protective factors).  Therapist will assign homework for BA and help patient identify values that align/do not align with current daily activities.  Therapist will teach the principles of MBSR and assign patient mindfulness exercises.    Goal 2: Patient will work towards developing effective strategies for managing ADHD symptoms, enhancing focus, improving organizational and time-management skills, and building coping mechanisms to reduce hyperactivity/impulsivity.    I will know I've met my goal when I give myself elvis for breaking down tasks and developing more time awareness.      Objective #A (Patient Action).           Patient will identify and use 4 strategies to improve organization.  Patient will implement 4 procrastination strategies.  Patient will identify and compliment positives at least 2 times daily to support positive self-image.  Patient will identify 2 ways that ADHD causes difficulties interpersonally and at work.  Status: Continued - Date: 2/26/25    Intervention(s)  Therapist will use action-oriented therapies, such as Behavioral Therapy, CBT, and DBT to help manage attention and hyperactivity symptoms.  Therapist will use self-instruction therapy to help patient learn how to \"talk herself through\" a task or activity (e.g., Stop Think Do).  Therapist will provide additional materials on ADHD resources (books, podcasts, websites).    Goal 3: Patient will learn coping skills and lower anxiety levels, as measured by the ISABEL-7.    I will know I've met my goal when I can identify the anxiety, manage it, and then move on.      Objective #A (Patient Action)  Patient will identify negative statements that she makes to herself.  Patient will identify the connection between anxiety and self-talk.  Patient " "will disrupt dysfunctional thoughts by increasing awareness for internal self-talk through relaxation, exercise, or other positive activities.  Patient will use thought-stopping strategy daily to reduce intrusive thoughts.  Status: Maintenance - Date: 2/26/25     Intervention(s)  Therapist will provide psychoeducation on CBT and skills for cognitive restructuring.    Goal 4: Patient will focus on healing from her adverse and negative experiences, developing the ability to live fully in the present, and creating a positive future with healthy, supportive relationships.    I will know I've met my goal when I have better associations with these memories and not let my past experiences dictate my life.\"    Objective #A (Patient Action).           Patient will learn how safety and security in her body looks and in relationships.  Patient will use grounding techniques.  Patient will learn about trauma-related treatment (education of PTSD, protocol of EMDR, AIP model, 8 phases)  Patient will identify past memory, present triggers, and future responses following the AIP model.  Patient will participate in sessions of prolonged exposure or EMDR.  Patient will identify three distraction and diversion activities and use those activities to decrease level of anxiety from flashbacks.  Status: Continued - Date: 2/26/25    Intervention(s)  Therapist will use a Trauma-Informed Care (Trauma-Focused ACT, EMDR, PE-PC, and IFS-Informed) approach by creating safety, empowering patient by educating her on options and giving her an active role in care, and transparency about treatment interventions.    Therapist will provide EMDR, psychoeducation, behavioral activation, and cognitive restructuring.  Therapist will provide education materials on childhood and adult attachment styles.  Therapist will teach patient about her own Internal Family System (IFS), including managers, firefighters, and exiles to better break through defenses and " develop an integrated sense of self.  Therapist will provide education on Emotion Body Mapping.     Patient has reviewed and agreed to the above plan.    Sandra Bradley PsyD, Postdoctoral Fellow February 26, 2024

## 2025-06-26 ENCOUNTER — PATIENT OUTREACH (OUTPATIENT)
Dept: CARE COORDINATION | Facility: CLINIC | Age: 30
End: 2025-06-26
Payer: COMMERCIAL

## 2025-07-03 ENCOUNTER — VIRTUAL VISIT (OUTPATIENT)
Dept: PSYCHOLOGY | Facility: CLINIC | Age: 30
End: 2025-07-03
Payer: COMMERCIAL

## 2025-07-03 DIAGNOSIS — F43.89 STRESS REACTION, CHRONIC: ICD-10-CM

## 2025-07-03 DIAGNOSIS — F90.2 ADHD (ATTENTION DEFICIT HYPERACTIVITY DISORDER), COMBINED TYPE: ICD-10-CM

## 2025-07-03 DIAGNOSIS — F41.1 GAD (GENERALIZED ANXIETY DISORDER): ICD-10-CM

## 2025-07-03 DIAGNOSIS — F32.1 MODERATE MAJOR DEPRESSION (H): Primary | ICD-10-CM

## 2025-07-03 ASSESSMENT — PATIENT HEALTH QUESTIONNAIRE - PHQ9
SUM OF ALL RESPONSES TO PHQ QUESTIONS 1-9: 9
10. IF YOU CHECKED OFF ANY PROBLEMS, HOW DIFFICULT HAVE THESE PROBLEMS MADE IT FOR YOU TO DO YOUR WORK, TAKE CARE OF THINGS AT HOME, OR GET ALONG WITH OTHER PEOPLE: SOMEWHAT DIFFICULT
SUM OF ALL RESPONSES TO PHQ QUESTIONS 1-9: 9

## 2025-07-03 NOTE — PROGRESS NOTES
"[facility]   Mental Health & Addiction Services     Progress Note    Patient Name: Marya Mtz   \"Isidro\"  Date: 7/3/25      Service Type:  Individual      Session Start Time: 12:00 PM  Session End Time: 12:40 PM     Session Length: 40 minutes    Session #:  12    Attendees: Client attended alone     Service Modality:  Video Visit:      Provider verified identity through the following two step process.  Patient provided:  Patient is known previously to provider    Telemedicine Visit: The patient's condition can be safely assessed and treated via synchronous audio and visual telemedicine encounter.      Reason for Telemedicine Visit: Patient convenience (e.g. 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     Patient would like the video invitation sent by:  Send to e-mail at: [email or web address]    Mode of Communication:  Video Conference via ticckle    Distant Location (Provider):  Off-site    As the provider I attest to compliance with applicable laws and regulations related to telemedicine.    DATA  Crisis: No  Extended Session (53+ minutes): No  Interactive Complexity: No      Progress Since Last Session (Related to Symptoms / Goals / Homework):              Symptoms: Improving - Patient reported an increase in mood since the last session on 6/20/25, attributing it to setting boundaries with her mother.    Homework: Completed - Patient practiced re-parenting strategies by offering herself emotional validation, using compassionate self-talk, and " PRE-SEDATION ASSESSMENT    CONSENT       MEDICAL HISTORY       PHYSICAL EXAM       OTHER FINDINGS       SEDATION RISK ASSESSMENT       NARRATIVE FINDINGS      establishing a consistent self-care routine to support emotional boundaries and healing                Episode of Care Goals: Satisfactory progress - ACTION (Actively working towards change); Intervened by reinforcing change plan / affirming steps taken                Current / Ongoing Stressors and Concerns:  History of MH Difficulties, History of ADHD, Served in  ([id number]), Non-Combat (4 State Activations, 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                 Treatment Objective(s) Addressed in This Session:          Patient will identify the connection between anxiety and self-talk.  Patient will disrupt dysfunctional thoughts by increasing awareness for internal self-talk through relaxation, exercise, or other positive activities.  Patient will learn how safety and security in her body looks and in relationships.  Patient will use grounding techniques.                Intervention:  Session focused on reviewing the patient s thoughts, feelings, behaviors, and physical sensations over the past few weeks. Patient reported successfully setting boundaries with her mother to protect her emotional safety, which opened a discussion on how interactions with her mother often trigger anger responses. Clinician introduced the Anger Iceberg model to help the patient identify underlying emotions (hurt, loneliness, fear) beneath her anger. We collaboratively explored re-parenting strategies, and patient reflected on progress in implementing these (apologizing to her younger self, establishing a consistent bedtime, preparing meals she was previously restricted from, and declining family events that compromise her well-being). Clinician provided psychoeducation on self-compassion practices to reinforce these behaviors.    Assessments completed prior to  visit:  PHQ9:       1/30/2025    10:45 AM 2/13/2025    11:47 AM 2/26/2025    11:48 AM 3/26/2025    12:01 PM 4/23/2025    11:47 AM 6/20/2025    10:59 AM 7/3/2025    11:31 AM   PHQ-9 SCORE   PHQ-9 Total Score MyChart 5 (Mild depression) 6 (Mild depression) 6 (Mild depression) 7 (Mild depression) 5 (Mild depression) 6 (Mild depression) 9 (Mild depression)   PHQ-9 Total Score 5  6  6  7  5  6  9       GAD7:       1/16/2025    11:55 AM 1/30/2025    10:46 AM 2/13/2025    11:48 AM 2/26/2025    11:49 AM 3/26/2025    12:02 PM 4/23/2025    11:48 AM 6/20/2025    11:00 AM   ISABEL-7 SCORE   Total Score 1 (minimal anxiety) 2 (minimal anxiety) 4 (minimal anxiety) 2 (minimal anxiety) 2 (minimal anxiety) 3 (minimal anxiety) 6 (mild anxiety)   Total Score 1  2  4  2  2  3  6        TSQ:  Upsetting thoughts or memories about the event that have come into your mind against your will: No  Upsetting dreams about the event: No  Acting or feeling as though the event were happening again: No  Feeling upset by reminders of the event: No  Bodily reactions (such as fast heartbeat, stomach churning, sweatiness, dizziness) when reminded of the event: No  Difficulty falling or staying asleep: No  Irritability or outbursts of anger: No  Difficulty concentrating: Yes  Heightened awareness of potential dangers to yourself and others: Yes  Being jumpy or being startled at something unexpected: Yes  *Total Score on 11/22/24 = 3, indicating a low-risk of having PTSD according to the DSM and instead subclinical trauma-related symptoms.    KELSIE-II:  Total Score on 7/31/24 = 20.71, indicating low levels of dissociative experiences.     ASSESSMENT: Current Emotional / Mental Status (status of significant symptoms):   Risk status (Self / Other harm or suicidal ideation)   Patient denies current fears or concerns for personal safety.   Patient denies current or recent suicidal ideation or behaviors.   Patient denies current or recent homicidal ideation or  behaviors.   Patient denies current or recent self injurious behavior or ideation.   Patient denies other safety concerns.   Patient reports there has been no change in risk factors since her last session.     Patient reports there has been no change in protective factors since her last session.     Recommended that patient call 911 or go to the local ED should there be a change in any of these risk factors     Appearance:   Appropriate    Eye Contact:   Good    Psychomotor Behavior: Normal    Attitude:   Cooperative  Friendly Pleasant   Orientation:   All   Speech    Rate / Production: Talkative    Volume:  Normal    Mood:    Less sad   Affect:    Appropriate   Thought Content:  Focused on self-reflection and affirming the experiences of her younger self   Thought Form:  Coherent  Logical    Insight:    Good       Medication Review:   No changes to current psychiatric medication(s)     Medication Compliance:   Yes     Changes in Health Issues:   None reported     Chemical Use Review:   Substance Use: Chemical use reviewed, no active concerns identified      Tobacco Use: No current tobacco use.      Diagnosis:  Major Depressive Disorder, Single Episode, Moderate   Attention-Deficit/Hyperactivity Disorder, Combined presentation  Generalized Anxiety Disorder  Other Specified Trauma- and Stressor-Related Disorder    Collateral Reports Completed:  Telehealth Consent    PLAN: (Patient Tasks / Therapist Tasks / Other)  Patient will continue practicing re-parenting strategies by offering herself emotional validation, using compassionate self-talk, and establishing a consistent self-care routine to support emotional boundaries and healing. She will also journal about emotional shifts during boundary-setting moments. Follow-up appointment scheduled on 7/18/25.    Sandra Bradley PsyD, Postdoctoral Fellow    Clinical supervision and documentation review provided by Leonor Henry, PhD LP   "7/3/2025  ______________________________________________________________________    Individual Treatment Plan    Patient's Name: Marya Mtz  \"Isidro\"  YOB: 1995    Date of Creation: 11/22/24  Date Treatment Plan Last Reviewed/Revised: 6/20/25    DSM5 Diagnoses: Major Depressive Disorder, Single Episode, Moderate, Attention-Deficit/Hyperactivity Disorder, Combined presentation, Generalized Anxiety Disorder, and Other Specified Trauma- and Stressor-Related Disorder  Psychosocial / Contextual Factors: History of MH Difficulties, History of ADHD, Served in  ([id number]) Non-Combat (4 State Activations, 1 Overseas Deployment in [facility]), Loss/Grief (3 Unit Members Passed Away), Family Conflict, Student, Contextual Issues, and System of Oppression (Experienced Racial Microaggressions).    Referral / Collaboration:  Referral to another professional/service is not indicated at this time.    Anticipated number of session for this episode of care: ~ 20-26 sessions  Anticipation frequency of session: Every other week  Anticipated Duration of each session: 38-52 minutes  Treatment plan will be reviewed in 90 days or when goals have been changed.     MeasurableTreatment Goal(s) related to diagnosis / functional impairment(s)  Goal 1: Patient will improve mood and lower depression levels, as measured by the PHQ-9.    I will know I've met my goal when I get back into my solo hobbies and reaching out to friends and loved ones more.\"    Objective #A (Patient Action)   Patient will increase interest, engagement, and pleasure in doing things.  Patient will decrease frequency and intensity of feeling down, depressed, hopeless.        Patient will Identify negative self-talk and behaviors: challenge core beliefs, myths, and actions.  Status: Maintenance - Date: 6/20/25    Intervention(s)  Therapist will teach the CBT four-factor model, including analyzing automatic thoughts, uncovering core " "beliefs, disputing irrational thoughts, discussing functional analyses, increasing acceptance, and engaging in problem solving.  Therapist will provide psychoeducation on the 4P Model (predisposing, precipitating, perpetuating, and protective factors).  Therapist will assign homework for BA and help patient identify values that align/do not align with current daily activities.  Therapist will teach the principles of MBSR and assign patient mindfulness exercises.    Goal 2: Patient will work towards developing effective strategies for managing ADHD symptoms, enhancing focus, improving organizational and time-management skills, and building coping mechanisms to reduce hyperactivity/impulsivity.    I will know I've met my goal when I give myself elvis for breaking down tasks and developing more time awareness.      Objective #A (Patient Action).           Patient will identify and use 4 strategies to improve organization.  Patient will implement 4 procrastination strategies.  Patient will identify and compliment positives at least 2 times daily to support positive self-image.  Patient will identify 2 ways that ADHD causes difficulties interpersonally and at work.  Status: Continued - Date: 6/20/25    Intervention(s)  Therapist will use action-oriented therapies, such as Behavioral Therapy, CBT, and DBT to help manage attention and hyperactivity symptoms.  Therapist will use self-instruction therapy to help patient learn how to \"talk herself through\" a task or activity (e.g., Stop Think Do).  Therapist will provide additional materials on ADHD resources (books, podcasts, websites).    Goal 3: Patient will learn coping skills and lower anxiety levels, as measured by the ISABEL-7.    I will know I've met my goal when I can identify the anxiety, manage it, and then move on.      Objective #A (Patient Action)  Patient will identify negative statements that she makes to herself.  Patient will identify the connection between " "anxiety and self-talk.  Patient will disrupt dysfunctional thoughts by increasing awareness for internal self-talk through relaxation, exercise, or other positive activities.  Patient will use thought-stopping strategy daily to reduce intrusive thoughts.  Status: Maintenance - Date: 6/20/25     Intervention(s)  Therapist will provide psychoeducation on CBT and skills for cognitive restructuring.    Goal 4: Patient will focus on healing from her adverse and negative experiences, developing the ability to live fully in the present, and creating a positive future with healthy, supportive relationships.    I will know I've met my goal when I have better associations with these memories and not let my past experiences dictate my life.\"    Objective #A (Patient Action).           Patient will learn how safety and security in her body looks and in relationships.  Patient will use grounding techniques.  Patient will learn about trauma-related treatment (education of PTSD, protocol of EMDR, AIP model, 8 phases)  Patient will identify past memory, present triggers, and future responses following the AIP model.  Patient will participate in sessions of prolonged exposure or EMDR.  Patient will identify three distraction and diversion activities and use those activities to decrease level of anxiety from flashbacks.  Status: Continued - Date: 6/20/25    Intervention(s)  Therapist will use a Trauma-Informed Care (Trauma-Focused ACT, EMDR, PE-PC, and IFS-Informed) approach by creating safety, empowering patient by educating her on options and giving her an active role in care, and transparency about treatment interventions.    Therapist will provide EMDR, psychoeducation, behavioral activation, and cognitive restructuring.  Therapist will provide education materials on childhood and adult attachment styles.  Therapist will teach patient about her own Internal Family System (IFS), including managers, firefighters, and exiles to " better break through defenses and develop an integrated sense of self.  Therapist will provide education on Emotion Body Mapping.     Patient has reviewed and agreed to the above plan.    Sandra Bradley PsyD, Postdoctoral Fellow June 20, 2024

## 2025-07-25 ENCOUNTER — VIRTUAL VISIT (OUTPATIENT)
Dept: FAMILY MEDICINE | Facility: CLINIC | Age: 30
End: 2025-07-25
Payer: COMMERCIAL

## 2025-07-25 DIAGNOSIS — F90.2 ADHD (ATTENTION DEFICIT HYPERACTIVITY DISORDER), COMBINED TYPE: ICD-10-CM

## 2025-07-25 DIAGNOSIS — F41.9 ANXIETY: Primary | ICD-10-CM

## 2025-07-25 PROCEDURE — 1126F AMNT PAIN NOTED NONE PRSNT: CPT | Mod: 95 | Performed by: FAMILY MEDICINE

## 2025-07-25 PROCEDURE — 98006 SYNCH AUDIO-VIDEO EST MOD 30: CPT | Performed by: FAMILY MEDICINE

## 2025-07-25 RX ORDER — DEXTROAMPHETAMINE SACCHARATE, AMPHETAMINE ASPARTATE MONOHYDRATE, DEXTROAMPHETAMINE SULFATE AND AMPHETAMINE SULFATE 3.75; 3.75; 3.75; 3.75 MG/1; MG/1; MG/1; MG/1
15 CAPSULE, EXTENDED RELEASE ORAL DAILY
Qty: 30 CAPSULE | Refills: 0 | Status: SHIPPED | OUTPATIENT
Start: 2025-09-23 | End: 2025-10-23

## 2025-07-25 RX ORDER — ESCITALOPRAM OXALATE 10 MG/1
10 TABLET ORAL DAILY
Qty: 90 TABLET | Refills: 1 | Status: SHIPPED | OUTPATIENT
Start: 2025-07-25

## 2025-07-25 RX ORDER — DEXTROAMPHETAMINE SACCHARATE, AMPHETAMINE ASPARTATE MONOHYDRATE, DEXTROAMPHETAMINE SULFATE AND AMPHETAMINE SULFATE 3.75; 3.75; 3.75; 3.75 MG/1; MG/1; MG/1; MG/1
15 CAPSULE, EXTENDED RELEASE ORAL DAILY
Qty: 30 CAPSULE | Refills: 0 | Status: SHIPPED | OUTPATIENT
Start: 2025-08-24 | End: 2025-09-23

## 2025-07-25 RX ORDER — DEXTROAMPHETAMINE SACCHARATE, AMPHETAMINE ASPARTATE MONOHYDRATE, DEXTROAMPHETAMINE SULFATE AND AMPHETAMINE SULFATE 3.75; 3.75; 3.75; 3.75 MG/1; MG/1; MG/1; MG/1
15 CAPSULE, EXTENDED RELEASE ORAL DAILY
Qty: 30 CAPSULE | Refills: 0 | Status: SHIPPED | OUTPATIENT
Start: 2025-07-25 | End: 2025-08-24

## 2025-07-25 NOTE — PROGRESS NOTES
Isidro is a 30 year old who is being evaluated via a billable video visit.    How would you like to obtain your AVS? MyChart  If the video visit is dropped, the invitation should be resent by: Text to cell phone: [phone]  Will anyone else be joining your video visit? No      Assessment & Plan     Anxiety  She is doing therapy , exercising on a regualr basis , doing meditation , trying to eat healthy diet and also I have refilled her lexapro as well , no side effects   - escitalopram (LEXAPRO) 10 MG tablet; Take 1 tablet (10 mg) by mouth daily.    ADHD (attention deficit hyperactivity disorder), combined type  Seems that her Adderall XR 15 mg daily is heling with focus and attention and finishiung tasks , I refilled her Rx for the next three months , denies any side effects , will need to follow up in three months sooner if any concerns   - amphetamine-dextroamphetamine (ADDERALL XR) 15 MG 24 hr capsule; Take 1 capsule (15 mg) by mouth daily.  - amphetamine-dextroamphetamine (ADDERALL XR) 15 MG 24 hr capsule; Take 1 capsule (15 mg) by mouth daily.  - amphetamine-dextroamphetamine (ADDERALL XR) 15 MG 24 hr capsule; Take 1 capsule (15 mg) by mouth daily.    Follow-up       Subjective   Isidro is a 30 year old, presenting for the following health issues:  Refill Request (Adderall xr), RECHECK (lexapro), Anxiety, and A.D.H.D        7/25/2025    11:40 AM   Additional Questions   Roomed by ANGEL Moran   Accompanied by N/A         7/25/2025    11:40 AM   Patient Reported Additional Medications   Patient reports taking the following new medications Patient denies     History of Present Illness       Reason for visit:  Medication refil    She eats 2-3 servings of fruits and vegetables daily.She consumes 0 sweetened beverage(s) daily.She exercises with enough effort to increase her heart rate 60 or more minutes per day.  She exercises with enough effort to increase her heart rate 7 days per week. She is missing 2 dose(s) of  "medications per week.  She is not taking prescribed medications regularly due to remembering to take.      Medication Review: Patient would like to discuss starting Wellbutrin and stopping lexapro.     ADHD Follow-Up  Date of last ADHD office visit: 11/2024  Status since last visit: \"slightly worse\" per Pt report.  Taking controlled (daily) medications as prescribed: Patient reports taking 15 mg adderall XR daily.                        Parent/Patient Concerns with Medications: refill needed  ADHD Medication       Amphetamines Disp Start End     amphetamine-dextroamphetamine (ADDERALL XR) 10 MG 24 hr capsule 30 capsule 3/12/2024 --    Sig - Route: Take 1 capsule (10 mg) by mouth daily - Oral    Patient taking differently: Take 15 mg by mouth daily.    Class: E-Prescribe    Earliest Fill Date: 3/12/2024          Sleep: no problems  Home/Family Concerns: Stable  Peer Concerns: Stable    Co-Morbid Diagnosis: Anxiety    Currently in counseling: Yes        Medication Benefits:   Controlled symptoms: Attention span, Distractability, Finishing tasks, and Impulse control  Uncontrolled Symptoms : None    Medication side effects:  Side effects noted: none  Denies: appetite suppression, weight loss, insomnia, tics, palpitations, stomach ache, headache, emotional lability, rebound irritability, drowsiness, \"zombie\" effect, growth suppression, and dry mouth               Review of Systems  Constitutional, HEENT, cardiovascular, pulmonary, GI, , musculoskeletal, neuro, skin, endocrine and psych systems are negative, except as otherwise noted.      Objective    Vitals - Patient Reported  Systolic (Patient Reported):  (Pt denies VS to report at this time)  Pain Score: No Pain (0)      Vitals:  No vitals were obtained today due to virtual visit.    Physical Exam   GENERAL: alert and no distress  EYES: Eyes grossly normal to inspection.  No discharge or erythema, or obvious scleral/conjunctival abnormalities.  RESP: No audible " wheeze, cough, or visible cyanosis.    SKIN: Visible skin clear. No significant rash, abnormal pigmentation or lesions.  NEURO: Cranial nerves grossly intact.  Mentation and speech appropriate for age.  PSYCH: Appropriate affect, tone, and pace of words    No results found for any visits on 07/25/25.      Video-Visit Details    Type of service:  Video Visit   Originating Location (pt. Location): Home    Distant Location (provider location):  On-site  Platform used for Video Visit: Ld  Signed Electronically by: Hannah Freedman MD

## 2025-07-28 ENCOUNTER — VIRTUAL VISIT (OUTPATIENT)
Dept: PSYCHOLOGY | Facility: CLINIC | Age: 30
End: 2025-07-28
Payer: COMMERCIAL

## 2025-07-28 DIAGNOSIS — F32.1 MODERATE MAJOR DEPRESSION (H): Primary | ICD-10-CM

## 2025-07-28 DIAGNOSIS — F43.89 STRESS REACTION, CHRONIC: ICD-10-CM

## 2025-07-28 DIAGNOSIS — F41.1 GAD (GENERALIZED ANXIETY DISORDER): ICD-10-CM

## 2025-07-28 DIAGNOSIS — F90.2 ADHD (ATTENTION DEFICIT HYPERACTIVITY DISORDER), COMBINED TYPE: ICD-10-CM

## 2025-07-28 PROCEDURE — 90834 PSYTX W PT 45 MINUTES: CPT | Mod: 95

## 2025-07-28 ASSESSMENT — PATIENT HEALTH QUESTIONNAIRE - PHQ9
SUM OF ALL RESPONSES TO PHQ QUESTIONS 1-9: 10
10. IF YOU CHECKED OFF ANY PROBLEMS, HOW DIFFICULT HAVE THESE PROBLEMS MADE IT FOR YOU TO DO YOUR WORK, TAKE CARE OF THINGS AT HOME, OR GET ALONG WITH OTHER PEOPLE: SOMEWHAT DIFFICULT
SUM OF ALL RESPONSES TO PHQ QUESTIONS 1-9: 10

## 2025-07-28 NOTE — PROGRESS NOTES
"[facility]   Mental Health & Addiction Services     Progress Note    Patient Name: Marya Mtz   \"Isidro\"  Date: 7/28/25      Service Type:  Individual      Session Start Time: 12:00 PM  Session End Time: 12:46 PM     Session Length: 46 minutes    Session #:  14    Attendees: Client attended alone     Service Modality:  Video Visit:      Provider verified identity through the following two step process.  Patient provided:  Patient is known previously to provider    Telemedicine Visit: The patient's condition can be safely assessed and treated via synchronous audio and visual telemedicine encounter.      Reason for Telemedicine Visit: Patient convenience (e.g. 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     Patient would like the video invitation sent by:  Send to e-mail at: [email or web address]    Mode of Communication:  Video Conference via [employer]    Distant Location (Provider):  Off-site    As the provider I attest to compliance with applicable laws and regulations related to telemedicine.    DATA  Crisis: No  Extended Session (53+ minutes): No  Interactive Complexity: No      Progress Since Last Session (Related to Symptoms / Goals / Homework):              Symptoms: No Changes - Patient reported no significant changes and mood has remained low since the last session on 7/18/25.    Homework: Partially Completed - Patient inconsistently practiced self-compassion and reframing to honor her [place] cultural values while also affirming her individual needs " and decisions.                 Episode of Care Goals: Minimal progress - ACTION (Actively working towards change); Intervened by reinforcing change plan / affirming steps taken                Current / Ongoing Stressors and Concerns:  History of MH Difficulties, History of ADHD, Served in  ([id number]) Non-Combat (4 State Activations, 1 Overseas Deployment in [facility]), Loss/Grief (3 Unit Members Passed Away), Family Conflict, Student, Contextual Issues, and System of Oppression (Experienced Racial Microaggressions).                 Treatment Objective(s) Addressed in This Session:          Patient will identify negative statements that she makes to herself.  Patient will disrupt dysfunctional thoughts by increasing awareness for internal self-talk through relaxation, exercise, or other positive activities.  Patient will learn how safety and security in her body looks and in relationships.  Patient will use grounding techniques.                Intervention:  Session focused on reviewing the patient s recent thoughts, feelings, behaviors, and physical sensations. Clinician summarized the previous session, specifically patient s internalized guilt related to family and cultural pressures from her mother. A values-based identity mapping exercise was introduced to help explore and externalize the internal conflict she is experiencing. Patient created a Venn diagram outlining her family/cultural values (e.g., education, honoring parents, kindness, ute, self-sacrifice for guests/relatives, obedience, subordination to males, and connection to nature) alongside her individual values (e.g., honesty, mental health, communication, ute, self-compassion, social connection, kindness, education, family, curiosity, and nature). She highlighted overlapping values including education, family, kindness, ute, and nature. This exercise helped the patient recognize that taking a break from school  may still align with her core values rather than oppose them. Session also included exploration of value conflicts, specifically between honoring parents and self-compassion, to better understand the origins of her guilt and support integration of cultural identity with her self-directed needs and values.    Assessments completed prior to visit:  PHQ9:       2/26/2025    11:48 AM 3/26/2025    12:01 PM 4/23/2025    11:47 AM 6/20/2025    10:59 AM 7/3/2025    11:31 AM 7/18/2025     9:21 AM 7/28/2025    11:38 AM   PHQ-9 SCORE   PHQ-9 Total Score MyChart 6 (Mild depression) 7 (Mild depression) 5 (Mild depression) 6 (Mild depression) 9 (Mild depression) 12 (Moderate depression) 10 (Moderate depression)   PHQ-9 Total Score 6  7  5  6  9  12  10       GAD7:       1/30/2025    10:46 AM 2/13/2025    11:48 AM 2/26/2025    11:49 AM 3/26/2025    12:02 PM 4/23/2025    11:48 AM 6/20/2025    11:00 AM 7/18/2025     9:22 AM   ISABEL-7 SCORE   Total Score 2 (minimal anxiety) 4 (minimal anxiety) 2 (minimal anxiety) 2 (minimal anxiety) 3 (minimal anxiety) 6 (mild anxiety) 6 (mild anxiety)   Total Score 2  4  2  2  3  6  6       TSQ:  Upsetting thoughts or memories about the event that have come into your mind against your will: No  Upsetting dreams about the event: No  Acting or feeling as though the event were happening again: No  Feeling upset by reminders of the event: No  Bodily reactions (such as fast heartbeat, stomach churning, sweatiness, dizziness) when reminded of the event: No  Difficulty falling or staying asleep: No  Irritability or outbursts of anger: No  Difficulty concentrating: Yes  Heightened awareness of potential dangers to yourself and others: Yes  Being jumpy or being startled at something unexpected: Yes  *Total Score on 11/22/24 = 3, indicating a low-risk of having PTSD according to the DSM and instead subclinical trauma-related symptoms.    KELSIE-II:  Total Score on 7/31/24 = 20.71, indicating low levels of  dissociative experiences.     ASSESSMENT: Current Emotional / Mental Status (status of significant symptoms):   Risk status (Self / Other harm or suicidal ideation)   Patient denies current fears or concerns for personal safety.   Patient denies current or recent suicidal ideation or behaviors.   Patient denies current or recent homicidal ideation or behaviors.   Patient denies current or recent self injurious behavior or ideation.   Patient denies other safety concerns.   Patient reports there has been no change in risk factors since her last session.     Patient reports there has been no change in protective factors since her last session.     Recommended that patient call 911 or go to the local ED should there be a change in any of these risk factors     Appearance:   Appropriate    Eye Contact:   Good    Psychomotor Behavior: Normal    Attitude:   Cooperative  Friendly Pleasant   Orientation:   All   Speech    Rate / Production: Less talkative    Volume:  Normal    Mood:    Sad   Affect:    Appropriate   Thought Content:  Focused on values and internalized guilt   Thought Form:  Coherent  Logical    Insight:    Good       Medication Review:   No changes to current psychiatric medication(s)     Medication Compliance:   Yes     Changes in Health Issues:   None reported     Chemical Use Review:   Substance Use: Chemical use reviewed, no active concerns identified      Tobacco Use: No current tobacco use.      Diagnosis:  Major Depressive Disorder, Single Episode, Moderate   Attention-Deficit/Hyperactivity Disorder, Combined presentation  Generalized Anxiety Disorder  Other Specified Trauma- and Stressor-Related Disorder    Collateral Reports Completed:  Telehealth Consent    PLAN: (Patient Tasks / Therapist Tasks / Other)  Patient will identify one step to honor both her cultural values and individual needs, aiming to reduce internalized guilt and foster a more secure sense of self. Follow-up appointment scheduled  "on 8/11/25.    Sandra Bradley PsyD, Postdoctoral Fellow    Clinical supervision and documentation review provided by Leonor Henry, PhD LP  7/28/2025  ______________________________________________________________________    Individual Treatment Plan    Patient's Name: Marya Mtz  \"Isidro\"  YOB: 1995    Date of Creation: 11/22/24  Date Treatment Plan Last Reviewed/Revised: 6/20/25    DSM5 Diagnoses: Major Depressive Disorder, Single Episode, Moderate, Attention-Deficit/Hyperactivity Disorder, Combined presentation, Generalized Anxiety Disorder, and Other Specified Trauma- and Stressor-Related Disorder  Psychosocial / Contextual Factors: History of MH Difficulties, History of ADHD, Served in  ([id number]) Non-Combat (4 State Activations, 1 Overseas Deployment in [facility]), Loss/Grief (3 Unit Members Passed Away), Family Conflict, Student, Contextual Issues, and System of Oppression (Experienced Racial Microaggressions).    Referral / Collaboration:  Referral to another professional/service is not indicated at this time.    Anticipated number of session for this episode of care: ~ 20-26 sessions  Anticipation frequency of session: Every other week  Anticipated Duration of each session: 38-52 minutes  Treatment plan will be reviewed in 90 days or when goals have been changed.     MeasurableTreatment Goal(s) related to diagnosis / functional impairment(s)  Goal 1: Patient will improve mood and lower depression levels, as measured by the PHQ-9.    I will know I've met my goal when I get back into my solo hobbies and reaching out to friends and loved ones more.\"    Objective #A (Patient Action)   Patient will increase interest, engagement, and pleasure in doing things.  Patient will decrease frequency and intensity of feeling down, depressed, hopeless.        Patient will Identify negative self-talk and behaviors: challenge core beliefs, myths, and actions.  Status: Maintenance - Date: " "6/20/25    Intervention(s)  Therapist will teach the CBT four-factor model, including analyzing automatic thoughts, uncovering core beliefs, disputing irrational thoughts, discussing functional analyses, increasing acceptance, and engaging in problem solving.  Therapist will provide psychoeducation on the 4P Model (predisposing, precipitating, perpetuating, and protective factors).  Therapist will assign homework for BA and help patient identify values that align/do not align with current daily activities.  Therapist will teach the principles of MBSR and assign patient mindfulness exercises.    Goal 2: Patient will work towards developing effective strategies for managing ADHD symptoms, enhancing focus, improving organizational and time-management skills, and building coping mechanisms to reduce hyperactivity/impulsivity.    I will know I've met my goal when I give myself elvis for breaking down tasks and developing more time awareness.      Objective #A (Patient Action).           Patient will identify and use 4 strategies to improve organization.  Patient will implement 4 procrastination strategies.  Patient will identify and compliment positives at least 2 times daily to support positive self-image.  Patient will identify 2 ways that ADHD causes difficulties interpersonally and at work.  Status: Continued - Date: 6/20/25    Intervention(s)  Therapist will use action-oriented therapies, such as Behavioral Therapy, CBT, and DBT to help manage attention and hyperactivity symptoms.  Therapist will use self-instruction therapy to help patient learn how to \"talk herself through\" a task or activity (e.g., Stop Think Do).  Therapist will provide additional materials on ADHD resources (books, podcasts, websites).    Goal 3: Patient will learn coping skills and lower anxiety levels, as measured by the ISABEL-7.    I will know I've met my goal when I can identify the anxiety, manage it, and then move on.      Objective #A " "(Patient Action)  Patient will identify negative statements that she makes to herself.  Patient will identify the connection between anxiety and self-talk.  Patient will disrupt dysfunctional thoughts by increasing awareness for internal self-talk through relaxation, exercise, or other positive activities.  Patient will use thought-stopping strategy daily to reduce intrusive thoughts.  Status: Maintenance - Date: 6/20/25     Intervention(s)  Therapist will provide psychoeducation on CBT and skills for cognitive restructuring.    Goal 4: Patient will focus on healing from her adverse and negative experiences, developing the ability to live fully in the present, and creating a positive future with healthy, supportive relationships.    I will know I've met my goal when I have better associations with these memories and not let my past experiences dictate my life.\"    Objective #A (Patient Action).           Patient will learn how safety and security in her body looks and in relationships.  Patient will use grounding techniques.  Patient will learn about trauma-related treatment (education of PTSD, protocol of EMDR, AIP model, 8 phases)  Patient will identify past memory, present triggers, and future responses following the AIP model.  Patient will participate in sessions of prolonged exposure or EMDR.  Patient will identify three distraction and diversion activities and use those activities to decrease level of anxiety from flashbacks.  Status: Continued - Date: 6/20/25    Intervention(s)  Therapist will use a Trauma-Informed Care (Trauma-Focused ACT, EMDR, PE-PC, and IFS-Informed) approach by creating safety, empowering patient by educating her on options and giving her an active role in care, and transparency about treatment interventions.    Therapist will provide EMDR, psychoeducation, behavioral activation, and cognitive restructuring.  Therapist will provide education materials on childhood and adult attachment " styles.  Therapist will teach patient about her own Internal Family System (IFS), including managers, firefighters, and exiles to better break through defenses and develop an integrated sense of self.  Therapist will provide education on Emotion Body Mapping.     Patient has reviewed and agreed to the above plan.    Sandra Bradley PsyD, Postdoctoral Fellow June 20, 2024